# Patient Record
Sex: MALE | Race: WHITE | NOT HISPANIC OR LATINO | Employment: FULL TIME | URBAN - METROPOLITAN AREA
[De-identification: names, ages, dates, MRNs, and addresses within clinical notes are randomized per-mention and may not be internally consistent; named-entity substitution may affect disease eponyms.]

---

## 2020-03-27 ENCOUNTER — HOSPITAL ENCOUNTER (EMERGENCY)
Facility: HOSPITAL | Age: 40
Discharge: HOME | End: 2020-03-27
Admitting: EMERGENCY MEDICINE

## 2020-03-27 VITALS
BODY MASS INDEX: 21.02 KG/M2 | WEIGHT: 155.2 LBS | SYSTOLIC BLOOD PRESSURE: 110 MMHG | HEART RATE: 74 BPM | RESPIRATION RATE: 19 BRPM | DIASTOLIC BLOOD PRESSURE: 82 MMHG | OXYGEN SATURATION: 95 % | TEMPERATURE: 97.4 F | HEIGHT: 72 IN

## 2020-03-27 DIAGNOSIS — K04.7 DENTAL ABSCESS: Primary | ICD-10-CM

## 2020-03-27 PROCEDURE — 99282 EMERGENCY DEPT VISIT SF MDM: CPT

## 2020-03-27 RX ORDER — PENICILLIN V POTASSIUM 500 MG/1
500 TABLET ORAL 4 TIMES DAILY
Qty: 40 TABLET | Refills: 0 | Status: SHIPPED | OUTPATIENT
Start: 2020-03-27 | End: 2020-10-06

## 2020-03-27 RX ORDER — IBUPROFEN 800 MG/1
800 TABLET ORAL EVERY 6 HOURS PRN
Qty: 30 TABLET | Refills: 0 | Status: SHIPPED | OUTPATIENT
Start: 2020-03-27 | End: 2020-10-06

## 2020-10-06 ENCOUNTER — HOSPITAL ENCOUNTER (OUTPATIENT)
Facility: HOSPITAL | Age: 40
Setting detail: OBSERVATION
Discharge: HOME | End: 2020-10-07
Attending: EMERGENCY MEDICINE | Admitting: HOSPITALIST

## 2020-10-06 DIAGNOSIS — F10.939 ALCOHOL WITHDRAWAL SYNDROME WITH COMPLICATION (HCC): ICD-10-CM

## 2020-10-06 DIAGNOSIS — R55 SYNCOPE, UNSPECIFIED SYNCOPE TYPE: Primary | ICD-10-CM

## 2020-10-06 LAB
ALBUMIN SERPL-MCNC: 5.1 G/DL (ref 3.5–5.2)
ALBUMIN/GLOB SERPL: 1.4 G/DL
ALP SERPL-CCNC: 72 U/L (ref 39–117)
ALT SERPL W P-5'-P-CCNC: 110 U/L (ref 1–41)
ANION GAP SERPL CALCULATED.3IONS-SCNC: 19 MMOL/L (ref 5–15)
AST SERPL-CCNC: 277 U/L (ref 1–40)
BASOPHILS # BLD AUTO: 0.1 10*3/MM3 (ref 0–0.2)
BASOPHILS NFR BLD AUTO: 2 % (ref 0–1.5)
BILIRUB SERPL-MCNC: 0.5 MG/DL (ref 0–1.2)
BUN SERPL-MCNC: 7 MG/DL (ref 6–20)
BUN SERPL-MCNC: ABNORMAL MG/DL
BUN/CREAT SERPL: ABNORMAL
CALCIUM SPEC-SCNC: 9.2 MG/DL (ref 8.6–10.5)
CHLORIDE SERPL-SCNC: 102 MMOL/L (ref 98–107)
CK SERPL-CCNC: 116 U/L (ref 20–200)
CO2 SERPL-SCNC: 22 MMOL/L (ref 22–29)
CREAT SERPL-MCNC: 0.79 MG/DL (ref 0.76–1.27)
DEPRECATED RDW RBC AUTO: 52.5 FL (ref 37–54)
EOSINOPHIL # BLD AUTO: 0 10*3/MM3 (ref 0–0.4)
EOSINOPHIL NFR BLD AUTO: 0.2 % (ref 0.3–6.2)
ERYTHROCYTE [DISTWIDTH] IN BLOOD BY AUTOMATED COUNT: 15 % (ref 12.3–15.4)
ETHANOL UR QL: 0.42 %
GFR SERPL CREATININE-BSD FRML MDRD: 109 ML/MIN/1.73
GLOBULIN UR ELPH-MCNC: 3.7 GM/DL
GLUCOSE SERPL-MCNC: 113 MG/DL (ref 65–99)
HCT VFR BLD AUTO: 52.1 % (ref 37.5–51)
HGB BLD-MCNC: 17.9 G/DL (ref 13–17.7)
LIPASE SERPL-CCNC: 71 U/L (ref 13–60)
LYMPHOCYTES # BLD AUTO: 1.4 10*3/MM3 (ref 0.7–3.1)
LYMPHOCYTES NFR BLD AUTO: 34.1 % (ref 19.6–45.3)
MAGNESIUM SERPL-MCNC: 2.2 MG/DL (ref 1.6–2.6)
MCH RBC QN AUTO: 34.2 PG (ref 26.6–33)
MCHC RBC AUTO-ENTMCNC: 34.4 G/DL (ref 31.5–35.7)
MCV RBC AUTO: 99.3 FL (ref 79–97)
MONOCYTES # BLD AUTO: 0.2 10*3/MM3 (ref 0.1–0.9)
MONOCYTES NFR BLD AUTO: 5.9 % (ref 5–12)
NEUTROPHILS NFR BLD AUTO: 2.5 10*3/MM3 (ref 1.7–7)
NEUTROPHILS NFR BLD AUTO: 57.8 % (ref 42.7–76)
NRBC BLD AUTO-RTO: 0.2 /100 WBC (ref 0–0.2)
PLATELET # BLD AUTO: 206 10*3/MM3 (ref 140–450)
PMV BLD AUTO: 6.5 FL (ref 6–12)
POTASSIUM SERPL-SCNC: 4 MMOL/L (ref 3.5–5.2)
PROT SERPL-MCNC: 8.8 G/DL (ref 6–8.5)
RBC # BLD AUTO: 5.24 10*6/MM3 (ref 4.14–5.8)
SODIUM SERPL-SCNC: 143 MMOL/L (ref 136–145)
TROPONIN T SERPL-MCNC: <0.01 NG/ML (ref 0–0.03)
WBC # BLD AUTO: 4.3 10*3/MM3 (ref 3.4–10.8)

## 2020-10-06 PROCEDURE — 93005 ELECTROCARDIOGRAM TRACING: CPT | Performed by: EMERGENCY MEDICINE

## 2020-10-06 PROCEDURE — 99285 EMERGENCY DEPT VISIT HI MDM: CPT

## 2020-10-06 PROCEDURE — 99219 PR INITIAL OBSERVATION CARE/DAY 50 MINUTES: CPT | Performed by: STUDENT IN AN ORGANIZED HEALTH CARE EDUCATION/TRAINING PROGRAM

## 2020-10-06 PROCEDURE — 25010000002 MAGNESIUM SULFATE 2 GM/50ML SOLUTION: Performed by: EMERGENCY MEDICINE

## 2020-10-06 PROCEDURE — 96375 TX/PRO/DX INJ NEW DRUG ADDON: CPT

## 2020-10-06 PROCEDURE — 25010000002 THIAMINE PER 100 MG: Performed by: EMERGENCY MEDICINE

## 2020-10-06 PROCEDURE — 96365 THER/PROPH/DIAG IV INF INIT: CPT

## 2020-10-06 PROCEDURE — 80307 DRUG TEST PRSMV CHEM ANLYZR: CPT | Performed by: EMERGENCY MEDICINE

## 2020-10-06 PROCEDURE — 96368 THER/DIAG CONCURRENT INF: CPT

## 2020-10-06 PROCEDURE — 85025 COMPLETE CBC W/AUTO DIFF WBC: CPT | Performed by: EMERGENCY MEDICINE

## 2020-10-06 PROCEDURE — 83735 ASSAY OF MAGNESIUM: CPT | Performed by: EMERGENCY MEDICINE

## 2020-10-06 PROCEDURE — 80053 COMPREHEN METABOLIC PANEL: CPT | Performed by: EMERGENCY MEDICINE

## 2020-10-06 PROCEDURE — 83690 ASSAY OF LIPASE: CPT | Performed by: EMERGENCY MEDICINE

## 2020-10-06 PROCEDURE — 25010000002 LORAZEPAM PER 2 MG: Performed by: EMERGENCY MEDICINE

## 2020-10-06 PROCEDURE — 84484 ASSAY OF TROPONIN QUANT: CPT | Performed by: EMERGENCY MEDICINE

## 2020-10-06 PROCEDURE — G0378 HOSPITAL OBSERVATION PER HR: HCPCS

## 2020-10-06 PROCEDURE — 82550 ASSAY OF CK (CPK): CPT | Performed by: EMERGENCY MEDICINE

## 2020-10-06 RX ORDER — SODIUM CHLORIDE 0.9 % (FLUSH) 0.9 %
10 SYRINGE (ML) INJECTION AS NEEDED
Status: DISCONTINUED | OUTPATIENT
Start: 2020-10-06 | End: 2020-10-07 | Stop reason: HOSPADM

## 2020-10-06 RX ORDER — MAGNESIUM SULFATE HEPTAHYDRATE 40 MG/ML
2 INJECTION, SOLUTION INTRAVENOUS ONCE
Status: COMPLETED | OUTPATIENT
Start: 2020-10-06 | End: 2020-10-06

## 2020-10-06 RX ORDER — LORAZEPAM 2 MG/ML
1 INJECTION INTRAMUSCULAR ONCE
Status: COMPLETED | OUTPATIENT
Start: 2020-10-06 | End: 2020-10-06

## 2020-10-06 RX ADMIN — LORAZEPAM 1 MG: 2 INJECTION INTRAMUSCULAR; INTRAVENOUS at 18:32

## 2020-10-06 RX ADMIN — LORAZEPAM 1 MG: 2 INJECTION INTRAMUSCULAR; INTRAVENOUS at 23:29

## 2020-10-06 RX ADMIN — FOLIC ACID 1000 ML/HR: 5 INJECTION, SOLUTION INTRAMUSCULAR; INTRAVENOUS; SUBCUTANEOUS at 19:15

## 2020-10-06 RX ADMIN — MAGNESIUM SULFATE HEPTAHYDRATE 2 G: 40 INJECTION, SOLUTION INTRAVENOUS at 19:15

## 2020-10-06 NOTE — ED NOTES
Pt reports that he has been on a binder drinking a 1/5 of vodka a day because it was the anniversary of his brothers death. Reports he passed out on the toilet and that wife called EMS. Pt reports he does want help and has already looked  Into rehab.     Priscila Hood, LPN  10/06/20 7411

## 2020-10-06 NOTE — ED PROVIDER NOTES
Subjective   Complaint alcohol withdrawal passed out    History of present illness 40-year-old male who states he has history of alcoholism drinks heavily every day who has not had alcohol in last 12 hours complains of twitching nauseated withdrawal type symptoms.  He states he was trying go the bathroom and he passed out he states for 15 minutes there was no seizure activity described there was no loss of bladder bowel control and he did not bite his tongue.  He states he is going to a treatment facility in [place] he is leaving Thursday.  Will be there for 30 days.  He denies any other drug use.  Symptoms are moderate nothing makes it better or worse continuous all day today.          Review of Systems   Constitutional: Negative for chills and fever.   HENT: Negative for congestion and sinus pressure.    Eyes: Negative for photophobia and visual disturbance.   Respiratory: Negative for chest tightness and shortness of breath.    Cardiovascular: Negative for chest pain and leg swelling.   Gastrointestinal: Positive for nausea. Negative for abdominal pain.   Endocrine: Negative for cold intolerance and heat intolerance.   Genitourinary: Negative for difficulty urinating and dysuria.   Musculoskeletal: Negative for arthralgias and back pain.   Skin: Negative for color change and pallor.   Neurological: Positive for tremors and syncope. Negative for dizziness, seizures, facial asymmetry, speech difficulty and light-headedness.   Psychiatric/Behavioral: Positive for agitation. Negative for behavioral problems and suicidal ideas.       No past medical history on file.  Alcoholism  No Known Allergies    Past Surgical History:   Procedure Laterality Date   • FRACTURE SURGERY         No family history on file.    Social History     Socioeconomic History   • Marital status:      Spouse name: Not on file   • Number of children: Not on file   • Years of education: Not on file   • Highest education level: Not on file    Tobacco Use   • Smoking status: Current Every Day Smoker     Packs/day: 1.00   Substance and Sexual Activity   • Alcohol use: Yes   • Drug use: Not Currently   • Sexual activity: Yes     Partners: Female     Prior to Admission medications    Medication Sig Start Date End Date Taking? Authorizing Provider   ibuprofen (ADVIL,MOTRIN) 800 MG tablet Take 1 tablet by mouth Every 6 (Six) Hours As Needed for Moderate Pain . 3/27/20   Alexia Holman APRN   penicillin v potassium (VEETID) 500 MG tablet Take 1 tablet by mouth 4 (Four) Times a Day. 3/27/20   Alexia Holman APRN           Objective   Physical Exam  40-year-old male awake alert no acute distress but has some muscle twitching HEENT extraocular is tach pupils equal react there is no photophobia is no nystagmus mouth clear his tongue is normal neck supple no adenopathy no JVD no bruits no cervical rest lumbar spine tenderness noted lungs clear no retractions heart regular without murmur M was soft no tenderness or masses extremities pulses equal upper and lower extremities no edema cords or Homans sign no evidence of DVT no cellulitic changes.  He is awake alert orientated x4 no facial asymmetry normal speech there is no weakness in extremities no focal deficits  Procedures           ED Course      Results for orders placed or performed during the hospital encounter of 10/06/20   Comprehensive Metabolic Panel    Specimen: Blood   Result Value Ref Range    Glucose 113 (H) 65 - 99 mg/dL    BUN      Creatinine 0.79 0.76 - 1.27 mg/dL    Sodium 143 136 - 145 mmol/L    Potassium 4.0 3.5 - 5.2 mmol/L    Chloride 102 98 - 107 mmol/L    CO2 22.0 22.0 - 29.0 mmol/L    Calcium 9.2 8.6 - 10.5 mg/dL    Total Protein 8.8 (H) 6.0 - 8.5 g/dL    Albumin 5.10 3.50 - 5.20 g/dL    ALT (SGPT) 110 (H) 1 - 41 U/L    AST (SGOT) 277 (H) 1 - 40 U/L    Alkaline Phosphatase 72 39 - 117 U/L    Total Bilirubin 0.5 0.0 - 1.2 mg/dL    eGFR Non African Amer 109 >60 mL/min/1.73    Globulin 3.7  gm/dL    A/G Ratio 1.4 g/dL    BUN/Creatinine Ratio      Anion Gap 19.0 (H) 5.0 - 15.0 mmol/L   Lipase    Specimen: Blood   Result Value Ref Range    Lipase 71 (H) 13 - 60 U/L   Troponin    Specimen: Blood   Result Value Ref Range    Troponin T <0.010 0.000 - 0.030 ng/mL   Ethanol    Specimen: Blood   Result Value Ref Range    Ethanol % 0.420 %   CK    Specimen: Blood   Result Value Ref Range    Creatine Kinase 116 20 - 200 U/L   Magnesium    Specimen: Blood   Result Value Ref Range    Magnesium 2.2 1.6 - 2.6 mg/dL   CBC Auto Differential    Specimen: Blood   Result Value Ref Range    WBC 4.30 3.40 - 10.80 10*3/mm3    RBC 5.24 4.14 - 5.80 10*6/mm3    Hemoglobin 17.9 (H) 13.0 - 17.7 g/dL    Hematocrit 52.1 (H) 37.5 - 51.0 %    MCV 99.3 (H) 79.0 - 97.0 fL    MCH 34.2 (H) 26.6 - 33.0 pg    MCHC 34.4 31.5 - 35.7 g/dL    RDW 15.0 12.3 - 15.4 %    RDW-SD 52.5 37.0 - 54.0 fl    MPV 6.5 6.0 - 12.0 fL    Platelets 206 140 - 450 10*3/mm3    Neutrophil % 57.8 42.7 - 76.0 %    Lymphocyte % 34.1 19.6 - 45.3 %    Monocyte % 5.9 5.0 - 12.0 %    Eosinophil % 0.2 (L) 0.3 - 6.2 %    Basophil % 2.0 (H) 0.0 - 1.5 %    Neutrophils, Absolute 2.50 1.70 - 7.00 10*3/mm3    Lymphocytes, Absolute 1.40 0.70 - 3.10 10*3/mm3    Monocytes, Absolute 0.20 0.10 - 0.90 10*3/mm3    Eosinophils, Absolute 0.00 0.00 - 0.40 10*3/mm3    Basophils, Absolute 0.10 0.00 - 0.20 10*3/mm3    nRBC 0.2 0.0 - 0.2 /100 WBC   BUN    Specimen: Blood   Result Value Ref Range    BUN 7 6 - 20 mg/dL     No radiology results for the last day  Medications   sodium chloride 0.9 % flush 10 mL (has no administration in time range)   LORazepam (ATIVAN) injection 1 mg (has no administration in time range)   multiple vitamin (M.V.I. Adult) 10 mL, thiamine (B-1) 100 mg, folic acid 1 mg in sodium chloride 0.9 % 1,000 mL infusion (0 mL/hr Intravenous Stopped 10/6/20 2140)   LORazepam (ATIVAN) injection 1 mg (1 mg Intravenous Given 10/6/20 3182)   magnesium sulfate 2g/50 mL (PREMIX)  infusion (0 g Intravenous Stopped 10/6/20 2015)          EKG my interpretation normal sinus rhythm rate of 84 normal axis no hypertrophy QTC of 437 normal EKG read no significant change from previous                                  MDM  Number of Diagnoses or Management Options  Alcohol withdrawal syndrome with complication (CMS/[facility]):   Syncope, unspecified syncope type:   Diagnosis management comments: Medical decision making.  Patient IV status started on a banana bag and given Ativan 1 mg IV and had the above exam evaluation CBC unremarkable hemoglobin is 17.9 lesion was normal troponin negative alcohol was 0.420 chemistries revealed a blood sugar 113 ALT of 110 AST of 277 bilirubin was normal lipase was 71.  The patient had no dysrhythmia or further syncopal episodes he was still very tremulous and somewhat shaky with blood pressure a little bit improved.  Heart rate in the 90s.  Given additional milligram of Ativan IV.  We talked about the findings.  I think his syncopal episode was probably more related to vasovagal dehydration.  We talked about outpatient versus inpatient but very uncomfortable going home.  I do not see any evidence of acute cardiac ischemia acute DVT pulmonary medicine or aortic dissection by clinical exam.  He was made aware of findings hospitalist nurse practitioner paged he will be placed in observation for further care stable unremarkable improved ER course      Final diagnoses:   Syncope, unspecified syncope type   Alcohol withdrawal syndrome with complication (CMS/[facility])            Ryan Gan MD  10/06/20 7812

## 2020-10-07 ENCOUNTER — READMISSION MANAGEMENT (OUTPATIENT)
Dept: CALL CENTER | Facility: HOSPITAL | Age: 40
End: 2020-10-07

## 2020-10-07 VITALS
BODY MASS INDEX: 24.11 KG/M2 | HEART RATE: 115 BPM | SYSTOLIC BLOOD PRESSURE: 129 MMHG | DIASTOLIC BLOOD PRESSURE: 87 MMHG | HEIGHT: 72 IN | RESPIRATION RATE: 20 BRPM | OXYGEN SATURATION: 99 % | WEIGHT: 178 LBS | TEMPERATURE: 98.4 F

## 2020-10-07 PROBLEM — F10.939 ALCOHOL WITHDRAWAL (HCC): Status: ACTIVE | Noted: 2020-10-07

## 2020-10-07 PROBLEM — F10.939 ALCOHOL WITHDRAWAL (HCC): Status: RESOLVED | Noted: 2020-10-07 | Resolved: 2020-10-07

## 2020-10-07 LAB
ALBUMIN SERPL-MCNC: 4.7 G/DL (ref 3.5–5.2)
ALP SERPL-CCNC: 63 U/L (ref 39–117)
ALT SERPL W P-5'-P-CCNC: 89 U/L (ref 1–41)
AMMONIA BLD-SCNC: 27 UMOL/L (ref 16–60)
ANION GAP SERPL CALCULATED.3IONS-SCNC: 17 MMOL/L (ref 5–15)
AST SERPL-CCNC: 159 U/L (ref 1–40)
BASOPHILS # BLD AUTO: 0.2 10*3/MM3 (ref 0–0.2)
BASOPHILS NFR BLD AUTO: 2.7 % (ref 0–1.5)
BILIRUB CONJ SERPL-MCNC: 0.2 MG/DL (ref 0–0.3)
BILIRUB INDIRECT SERPL-MCNC: 0.6 MG/DL
BILIRUB SERPL-MCNC: 0.8 MG/DL (ref 0–1.2)
BUN SERPL-MCNC: 11 MG/DL (ref 6–20)
BUN SERPL-MCNC: ABNORMAL MG/DL
BUN/CREAT SERPL: ABNORMAL
CALCIUM SPEC-SCNC: 8.6 MG/DL (ref 8.6–10.5)
CHLORIDE SERPL-SCNC: 99 MMOL/L (ref 98–107)
CO2 SERPL-SCNC: 23 MMOL/L (ref 22–29)
CREAT SERPL-MCNC: 0.76 MG/DL (ref 0.76–1.27)
DEPRECATED RDW RBC AUTO: 51.2 FL (ref 37–54)
EOSINOPHIL # BLD AUTO: 0 10*3/MM3 (ref 0–0.4)
EOSINOPHIL NFR BLD AUTO: 0.2 % (ref 0.3–6.2)
ERYTHROCYTE [DISTWIDTH] IN BLOOD BY AUTOMATED COUNT: 14.9 % (ref 12.3–15.4)
GFR SERPL CREATININE-BSD FRML MDRD: 114 ML/MIN/1.73
GLUCOSE BLDC GLUCOMTR-MCNC: 159 MG/DL (ref 70–105)
GLUCOSE BLDC GLUCOMTR-MCNC: 97 MG/DL (ref 70–105)
GLUCOSE SERPL-MCNC: 74 MG/DL (ref 65–99)
HCT VFR BLD AUTO: 46.2 % (ref 37.5–51)
HGB BLD-MCNC: 15.7 G/DL (ref 13–17.7)
LYMPHOCYTES # BLD AUTO: 2.1 10*3/MM3 (ref 0.7–3.1)
LYMPHOCYTES NFR BLD AUTO: 35.8 % (ref 19.6–45.3)
MCH RBC QN AUTO: 33.7 PG (ref 26.6–33)
MCHC RBC AUTO-ENTMCNC: 34.1 G/DL (ref 31.5–35.7)
MCV RBC AUTO: 98.8 FL (ref 79–97)
MONOCYTES # BLD AUTO: 0.7 10*3/MM3 (ref 0.1–0.9)
MONOCYTES NFR BLD AUTO: 11.9 % (ref 5–12)
NEUTROPHILS NFR BLD AUTO: 2.9 10*3/MM3 (ref 1.7–7)
NEUTROPHILS NFR BLD AUTO: 49.4 % (ref 42.7–76)
NRBC BLD AUTO-RTO: 0.2 /100 WBC (ref 0–0.2)
PLATELET # BLD AUTO: 181 10*3/MM3 (ref 140–450)
PMV BLD AUTO: 6.8 FL (ref 6–12)
POTASSIUM SERPL-SCNC: 4.3 MMOL/L (ref 3.5–5.2)
PROT SERPL-MCNC: 8 G/DL (ref 6–8.5)
RBC # BLD AUTO: 4.67 10*6/MM3 (ref 4.14–5.8)
SODIUM SERPL-SCNC: 139 MMOL/L (ref 136–145)
WBC # BLD AUTO: 5.9 10*3/MM3 (ref 3.4–10.8)

## 2020-10-07 PROCEDURE — 80076 HEPATIC FUNCTION PANEL: CPT | Performed by: STUDENT IN AN ORGANIZED HEALTH CARE EDUCATION/TRAINING PROGRAM

## 2020-10-07 PROCEDURE — 96376 TX/PRO/DX INJ SAME DRUG ADON: CPT

## 2020-10-07 PROCEDURE — G0378 HOSPITAL OBSERVATION PER HR: HCPCS

## 2020-10-07 PROCEDURE — 25010000002 ONDANSETRON PER 1 MG: Performed by: STUDENT IN AN ORGANIZED HEALTH CARE EDUCATION/TRAINING PROGRAM

## 2020-10-07 PROCEDURE — 82140 ASSAY OF AMMONIA: CPT | Performed by: STUDENT IN AN ORGANIZED HEALTH CARE EDUCATION/TRAINING PROGRAM

## 2020-10-07 PROCEDURE — 99217 PR OBSERVATION CARE DISCHARGE MANAGEMENT: CPT | Performed by: HOSPITALIST

## 2020-10-07 PROCEDURE — 85025 COMPLETE CBC W/AUTO DIFF WBC: CPT | Performed by: STUDENT IN AN ORGANIZED HEALTH CARE EDUCATION/TRAINING PROGRAM

## 2020-10-07 PROCEDURE — 80048 BASIC METABOLIC PNL TOTAL CA: CPT | Performed by: STUDENT IN AN ORGANIZED HEALTH CARE EDUCATION/TRAINING PROGRAM

## 2020-10-07 PROCEDURE — 25010000002 LORAZEPAM PER 2 MG: Performed by: STUDENT IN AN ORGANIZED HEALTH CARE EDUCATION/TRAINING PROGRAM

## 2020-10-07 PROCEDURE — 82962 GLUCOSE BLOOD TEST: CPT

## 2020-10-07 PROCEDURE — 96375 TX/PRO/DX INJ NEW DRUG ADDON: CPT

## 2020-10-07 RX ORDER — CHOLECALCIFEROL (VITAMIN D3) 125 MCG
5 CAPSULE ORAL NIGHTLY PRN
Status: DISCONTINUED | OUTPATIENT
Start: 2020-10-07 | End: 2020-10-07 | Stop reason: HOSPADM

## 2020-10-07 RX ORDER — LORAZEPAM 0.5 MG/1
0.5 TABLET ORAL
Status: DISCONTINUED | OUTPATIENT
Start: 2020-10-07 | End: 2020-10-07 | Stop reason: HOSPADM

## 2020-10-07 RX ORDER — LORAZEPAM 1 MG/1
2 TABLET ORAL
Status: DISCONTINUED | OUTPATIENT
Start: 2020-10-07 | End: 2020-10-07 | Stop reason: HOSPADM

## 2020-10-07 RX ORDER — ONDANSETRON 2 MG/ML
4 INJECTION INTRAMUSCULAR; INTRAVENOUS EVERY 6 HOURS PRN
Status: DISCONTINUED | OUTPATIENT
Start: 2020-10-07 | End: 2020-10-07 | Stop reason: HOSPADM

## 2020-10-07 RX ORDER — NITROGLYCERIN 0.4 MG/1
0.4 TABLET SUBLINGUAL
Status: DISCONTINUED | OUTPATIENT
Start: 2020-10-07 | End: 2020-10-07 | Stop reason: HOSPADM

## 2020-10-07 RX ORDER — ONDANSETRON 4 MG/1
4 TABLET, FILM COATED ORAL EVERY 6 HOURS PRN
Status: DISCONTINUED | OUTPATIENT
Start: 2020-10-07 | End: 2020-10-07 | Stop reason: HOSPADM

## 2020-10-07 RX ORDER — POTASSIUM CHLORIDE 7.45 MG/ML
10 INJECTION INTRAVENOUS
Status: DISCONTINUED | OUTPATIENT
Start: 2020-10-07 | End: 2020-10-07 | Stop reason: HOSPADM

## 2020-10-07 RX ORDER — MAGNESIUM SULFATE 1 G/100ML
1 INJECTION INTRAVENOUS AS NEEDED
Status: DISCONTINUED | OUTPATIENT
Start: 2020-10-07 | End: 2020-10-07 | Stop reason: HOSPADM

## 2020-10-07 RX ORDER — BISACODYL 10 MG
10 SUPPOSITORY, RECTAL RECTAL DAILY PRN
Status: DISCONTINUED | OUTPATIENT
Start: 2020-10-07 | End: 2020-10-07 | Stop reason: HOSPADM

## 2020-10-07 RX ORDER — ACETAMINOPHEN 325 MG/1
650 TABLET ORAL EVERY 4 HOURS PRN
Status: DISCONTINUED | OUTPATIENT
Start: 2020-10-07 | End: 2020-10-07 | Stop reason: HOSPADM

## 2020-10-07 RX ORDER — ACETAMINOPHEN 650 MG/1
650 SUPPOSITORY RECTAL EVERY 4 HOURS PRN
Status: DISCONTINUED | OUTPATIENT
Start: 2020-10-07 | End: 2020-10-07 | Stop reason: HOSPADM

## 2020-10-07 RX ORDER — POTASSIUM CHLORIDE 1.5 G/1.77G
40 POWDER, FOR SOLUTION ORAL AS NEEDED
Status: DISCONTINUED | OUTPATIENT
Start: 2020-10-07 | End: 2020-10-07 | Stop reason: HOSPADM

## 2020-10-07 RX ORDER — LORAZEPAM 2 MG/ML
2 INJECTION INTRAMUSCULAR
Status: DISCONTINUED | OUTPATIENT
Start: 2020-10-07 | End: 2020-10-07 | Stop reason: HOSPADM

## 2020-10-07 RX ORDER — ACETAMINOPHEN 160 MG/5ML
650 SOLUTION ORAL EVERY 4 HOURS PRN
Status: DISCONTINUED | OUTPATIENT
Start: 2020-10-07 | End: 2020-10-07 | Stop reason: HOSPADM

## 2020-10-07 RX ORDER — DOCUSATE SODIUM 100 MG/1
100 CAPSULE, LIQUID FILLED ORAL 2 TIMES DAILY PRN
Status: DISCONTINUED | OUTPATIENT
Start: 2020-10-07 | End: 2020-10-07 | Stop reason: HOSPADM

## 2020-10-07 RX ORDER — LORAZEPAM 2 MG/ML
1 INJECTION INTRAMUSCULAR
Status: DISCONTINUED | OUTPATIENT
Start: 2020-10-07 | End: 2020-10-07 | Stop reason: HOSPADM

## 2020-10-07 RX ORDER — SODIUM CHLORIDE 0.9 % (FLUSH) 0.9 %
10 SYRINGE (ML) INJECTION AS NEEDED
Status: DISCONTINUED | OUTPATIENT
Start: 2020-10-07 | End: 2020-10-07 | Stop reason: HOSPADM

## 2020-10-07 RX ORDER — MAGNESIUM SULFATE HEPTAHYDRATE 40 MG/ML
2 INJECTION, SOLUTION INTRAVENOUS AS NEEDED
Status: DISCONTINUED | OUTPATIENT
Start: 2020-10-07 | End: 2020-10-07 | Stop reason: HOSPADM

## 2020-10-07 RX ORDER — NICOTINE 21 MG/24HR
1 PATCH, TRANSDERMAL 24 HOURS TRANSDERMAL
Status: DISCONTINUED | OUTPATIENT
Start: 2020-10-07 | End: 2020-10-07 | Stop reason: HOSPADM

## 2020-10-07 RX ORDER — POTASSIUM CHLORIDE 20 MEQ/1
40 TABLET, EXTENDED RELEASE ORAL AS NEEDED
Status: DISCONTINUED | OUTPATIENT
Start: 2020-10-07 | End: 2020-10-07 | Stop reason: HOSPADM

## 2020-10-07 RX ORDER — SODIUM CHLORIDE 0.9 % (FLUSH) 0.9 %
10 SYRINGE (ML) INJECTION EVERY 12 HOURS SCHEDULED
Status: DISCONTINUED | OUTPATIENT
Start: 2020-10-07 | End: 2020-10-07 | Stop reason: HOSPADM

## 2020-10-07 RX ADMIN — ONDANSETRON 4 MG: 2 INJECTION INTRAMUSCULAR; INTRAVENOUS at 08:45

## 2020-10-07 RX ADMIN — Medication 10 ML: at 08:46

## 2020-10-07 RX ADMIN — NICOTINE 1 PATCH: 14 PATCH TRANSDERMAL at 03:41

## 2020-10-07 RX ADMIN — LORAZEPAM 2 MG: 1 TABLET ORAL at 08:45

## 2020-10-07 RX ADMIN — LORAZEPAM 2 MG: 2 INJECTION INTRAMUSCULAR; INTRAVENOUS at 03:38

## 2020-10-07 RX ADMIN — LORAZEPAM 0.5 MG: 0.5 TABLET ORAL at 14:17

## 2020-10-07 RX ADMIN — Medication 10 ML: at 01:18

## 2020-10-07 NOTE — OUTREACH NOTE
Prep Survey      Responses   [facility] patient discharged from?  [facility]   Is LACE score < 7 ?  Yes   Eligibility  [facility]   Date of Admission  10/06/20   Date of Discharge  10/07/20   Discharge diagnosis  Alcohol withdrawal syndrome with complication    Does the patient have one of the following disease processes/diagnoses(primary or secondary)?  Other   Does the patient have Home health ordered?  No   Is there a DME ordered?  No   Prep survey completed?  Yes          Inocencia Carrera RN

## 2020-10-07 NOTE — PROGRESS NOTES
Continued Stay Note  JAYNA Huang     Patient Name: Aleksandr Smith  MRN: [id number]  Today's Date: 10/7/2020    Admit Date: 10/6/2020    Discharge Plan     Row Name 10/07/20 1510       Plan    Plan Comments  SW met with pt at bedside wearing appropriate PPE. Pt reported having [employer] insurance and does not need med assist services. Pt reported having a five year history of alcohol abuse. Pt reported drinking 2 pints of vodka daily. Pt [facility] [place] [facility] two years ago and relapsed last year. Pt reported not being interested in any inpatient or out patient treatment facility at this time.        Juliette ACKERMAN   Cell: [phone]  Office: [phone]  Fax: [phone]

## 2020-10-07 NOTE — NURSING NOTE
Pt has been asked several times since his arrival to provide a urine specimen. Pt was asked again at 1900 just before IV fluid admin and he stated he cannot urinate but maybe after fluids. Pt was again asked just now and maintains that he doesn't have to pee.

## 2020-10-07 NOTE — PROGRESS NOTES
Discharge Planning Assessment  [facility]     Patient Name: Aleksandr Smith  MRN: [id number]  Today's Date: 10/7/2020    Admit Date: 10/6/2020    Discharge Needs Assessment     Row Name 10/07/20 1619       Living Environment    Lives With  spouse;child(jovita), dependent    Unique Family Situation  children are 8,10 and 15 yo    Current Living Arrangements  home/apartment/condo    Primary Care Provided by  self    Provides Primary Care For  child(jovita)    Family Caregiver if Needed  spouse    Able to Return to Prior Arrangements  yes       Resource/Environmental Concerns    Resource/Environmental Concerns  none       Transition Planning    Patient/Family Anticipates Transition to  home with family    Patient/Family Anticipated Services at Transition  none    Transportation Anticipated  family or friend will provide       Discharge Needs Assessment    Readmission Within the Last 30 Days  no previous admission in last 30 days    Equipment Currently Used at Home  none    Concerns to be Addressed  denies needs/concerns at this time    Concerns Comments  Pt states he has [employer] insurance,    Anticipated Changes Related to Illness  none    Equipment Needed After Discharge  none    Discharge Coordination/Progress  DC Plan: home with family.        Discharge Plan     Row Name 10/07/20 2173       Plan    Plan Comments  SW met with pt at bedside wearing appropriate PPE. Pt reported having [employer] insurance and does not need med assist services. Pt reported having a five year history of alcohol abuse. Pt reported drinking 2 pints of vodka daily. Pt reported going to [facility] for treatment two years ago and relapsed last year. Pt reported not being interested in any inpatient or out patient treatment facility at this time.               Expected Discharge Date and Time     Expected Discharge Date Expected Discharge Time    Oct 7, 2020         Demographic Summary     Row Name 10/07/20 1618       General Information     Admission Type  observation    Referral Source  admission list    Reason for Consult  discharge planning    Preferred Language  English     Used During This Interaction  no    General Information Comments  Spoke with pt, 6 ft distance, mask, goggles in place.                     Zaid Shah RN

## 2020-10-07 NOTE — H&P
[facility]      Patient Name: Aleksandr Smith  : 1980  MRN: [id number]  Primary Care Physician: Gibson Benoit Jr., MD  Date of admission: 10/6/2020    Patient Care Team:  Gibson Benoit Jr., MD as PCP - General          Subjective   History Present Illness     Chief Complaint:   Chief Complaint   Patient presents with   • Withdrawal       Mr. Smith is a 40 y.o. male who presents to [facility] ED with no reported medical history complaining of.  Patient states he has not had a drink in approximately 8 hours.  He states he normally drinks pints of vodka a day patient states he was possibly going to [place] and starting rehab on 10/8/2020.  He had no intention of stopping drug in today however he drank extremely excessively yesterday and passed out.  While he was passed out his wife called EMS.       In the ED, , troponin negative, glucose 113, anion gap 19.0,  , lipase 71, magnesium globin 17.9, MCV 99.3.  Blood alcohol level 0.420. EKG shows sinus rhythm with a rate of 84.  Patient admitted for further evaluation and treatment.          Review of Systems   Constitution: Negative for fever.   Cardiovascular: Negative for chest pain.   Respiratory: Negative for shortness of breath.    Gastrointestinal: Positive for nausea. Negative for abdominal pain and vomiting.   Psychiatric/Behavioral:        Alcholism   All other systems reviewed and are negative.          Personal History     Past Medical History: History reviewed. No pertinent past medical history.    Surgical History:      Past Surgical History:   Procedure Laterality Date   • FRACTURE SURGERY         Family History: family history includes No Known Problems in his father and mother. Otherwise pertinent FHx was reviewed and unremarkable.     Social History:  reports that he has been smoking. He has been smoking about 1.00 pack per day. He does not have any smokeless  tobacco history on file. He reports current alcohol use. He reports previous drug use.      Medications:  Prior to Admission medications    Medication Sig Start Date End Date Taking? Authorizing Provider   ibuprofen (ADVIL,MOTRIN) 800 MG tablet Take 1 tablet by mouth Every 6 (Six) Hours As Needed for Moderate Pain . 3/27/20   Alexia Holman APRN   penicillin v potassium (VEETID) 500 MG tablet Take 1 tablet by mouth 4 (Four) Times a Day. 3/27/20   Alexia Holman APRN       Allergies:  No Known Allergies    Objective   Objective     Vital Signs  Temp:  [97.1 °F (36.2 °C)] 97.1 °F (36.2 °C)  Heart Rate:  [84-93] 93  Resp:  [18] 18  BP: (104-147)/() 131/91  SpO2:  [96 %-98 %] 98 %  on   ;   Device (Oxygen Therapy): room air  Body mass index is 24.14 kg/m².    Physical Exam  Vitals signs reviewed.   Constitutional:       Appearance: Normal appearance. He is normal weight.   HENT:      Head: Normocephalic and atraumatic.      Nose: Nose normal.      Mouth/Throat:      Mouth: Mucous membranes are moist.      Pharynx: Oropharynx is clear.   Eyes:      Conjunctiva/sclera: Conjunctivae normal.      Pupils: Pupils are equal, round, and reactive to light.   Neck:      Musculoskeletal: Normal range of motion.   Cardiovascular:      Rate and Rhythm: Normal rate and regular rhythm.      Pulses: Normal pulses.      Heart sounds: Normal heart sounds.   Pulmonary:      Effort: Pulmonary effort is normal.      Breath sounds: Normal breath sounds.   Abdominal:      General: Bowel sounds are normal.      Palpations: Abdomen is soft.   Musculoskeletal: Normal range of motion.   Skin:     General: Skin is warm and dry.      Capillary Refill: Capillary refill takes less than 2 seconds.   Neurological:      General: No focal deficit present.      Mental Status: He is alert and oriented to person, place, and time.   Psychiatric:         Mood and Affect: Mood normal.         Behavior: Behavior normal.         Thought Content: Thought  content normal.         Judgment: Judgment normal.         Results Review:  I have personally reviewed most recent cardiac tracings and lab results and agree with findings.    Results from last 7 days   Lab Units 10/06/20  1826   WBC 10*3/mm3 4.30   HEMOGLOBIN g/dL 17.9*   HEMATOCRIT % 52.1*   PLATELETS 10*3/mm3 206     Results from last 7 days   Lab Units 10/06/20  1826   SODIUM mmol/L 143   POTASSIUM mmol/L 4.0   CHLORIDE mmol/L 102   CO2 mmol/L 22.0   BUN  7   CREATININE mg/dL 0.79   GLUCOSE mg/dL 113*   CALCIUM mg/dL 9.2   ALT (SGPT) U/L 110*   AST (SGOT) U/L 277*   TROPONIN T ng/mL <0.010     Estimated Creatinine Clearance: 141.9 mL/min (by C-G formula based on SCr of 0.79 mg/dL).  Brief Urine Lab Results     None          Microbiology Results (last 10 days)     ** No results found for the last 240 hours. **          ECG/EMG Results (most recent)     Procedure Component Value Units Date/Time    ECG 12 Lead [[id number]] Collected: 10/06/20 1810     Updated: 10/06/20 1811    Narrative:      HEART RATE= 84  bpm  RR Interval= 716  ms  NE Interval= 161  ms  P Horizontal Axis= 0  deg  P Front Axis= 11  deg  QRSD Interval= 102  ms  QT Interval= 391  ms  QRS Axis= 69  deg  T Wave Axis= 17  deg  - NORMAL ECG -  Sinus rhythm  ST elev, probable normal early repol pattern  When compared with ECG of 15-Dec-2014 14:50:12,  No significant change  Electronically Signed By:   Date and Time of Study: 2020-10-06 18:10:16              No radiology results for the last 7 days      Estimated Creatinine Clearance: 141.9 mL/min (by C-G formula based on SCr of 0.79 mg/dL).    Assessment/Plan   Assessment/Plan       Active Hospital Problems    Diagnosis  POA   • **Alcohol withdrawal (CMS/HCC) [F10.239]  Yes     Priority: High   • Syncope [R55]  Yes      Resolved Hospital Problems   No resolved problems to display.       Alcholism  -Encourage lifestyle modifications  -Elevated liver enzymes, monitor   -CIWA protocol  -Banana bag ordered  daily x3 days diet  -Falls precautions  -Seizure precautions  -Ammonia level, urine myoglobin  - Continuous cardiac monitoring  - Case management consult        VTE Prophylaxis -   Mechanical Order History:     SCDs      Pharmalogical Order History:     None          CODE STATUS:    Code Status and Medical Interventions:   Ordered at: 10/07/20 0041     Code Status:    CPR     Medical Interventions (Level of Support Prior to Arrest):    Full       This patient has been examined wearing appropriate Personal Protective Equipment . 10/06/20      I discussed the patient's findings and my recommendations with patient.        Electronically signed by SAL Grande, 10/06/20, 9:54 PM EDT.  [facility] Hospitalist Team

## 2020-10-07 NOTE — DISCHARGE SUMMARY
"      [facility] Medicine Services  DISCHARGE SUMMARY        Prepared For PCP:  Gibson Benoit Jr., MD    Patient Name: Aleksandr Smith  : 1980  MRN: [id number]      Date of Admission:   10/6/2020    Date of Discharge:  10/7/2020    Length of stay:  LOS: 0 days     Hospital Course     Presenting Problem:   Alcohol withdrawal syndrome with complication (CMS/HCC) [F10.239]  Syncope, unspecified syncope type [R55]      Active Hospital Problems   No active problems to display.      Resolved Hospital Problems    Diagnosis Date Resolved POA   • **Alcohol withdrawal (CMS/HCC) [F10.239] 10/07/2020 Yes     Priority: High           Hospital Course:    The patient was placed on observation.  He was able to sleep overnight tolerating diet and having no bladder or bowel dysfunction.  He was discharged home and needs to follow-up with his PCP            Recommendation for Outpatient Providers:         Patient needs to be monitored for alcohol abuse.    Reasons For Change In Medications and Indications for New Medications:        Day of Discharge     HPI:     The patient is a 40 y.o. male that presented to the emergency room on 10/6/20 after drinking alcohol.  The patient claimed to have               \" passed out \" at home the day before.  In the ED, blood alcohol level was 0.43.    Vital Signs:   Temp:  [97.1 °F (36.2 °C)-98.3 °F (36.8 °C)] 98.3 °F (36.8 °C)  Heart Rate:  [79-93] 86  Resp:  [18-20] 20  BP: (100-147)/() 136/87     Physical Exam:  Physical Exam  HENT:      Head: Normocephalic.      Mouth/Throat:      Mouth: Mucous membranes are moist.   Eyes:      Extraocular Movements: Extraocular movements intact.      Pupils: Pupils are equal, round, and reactive to light.   Neck:      Musculoskeletal: Normal range of motion.   Cardiovascular:      Rate and Rhythm: Normal rate and regular rhythm.   Pulmonary:      Effort: Pulmonary effort is normal.      Breath sounds: Normal breath sounds. "   Abdominal:      General: Bowel sounds are normal.      Palpations: Abdomen is soft.   Musculoskeletal: Normal range of motion.   Skin:     General: Skin is warm and dry.   Neurological:      General: No focal deficit present.      Mental Status: He is alert and oriented to person, place, and time.   Psychiatric:         Mood and Affect: Mood normal.         Pertinent  and/or Most Recent Results     Results from last 7 days   Lab Units 10/07/20  0605 10/06/20  1826   WBC 10*3/mm3 5.90 4.30   HEMOGLOBIN g/dL 15.7 17.9*   HEMATOCRIT % 46.2 52.1*   PLATELETS 10*3/mm3 181 206   SODIUM mmol/L 139 143   POTASSIUM mmol/L 4.3 4.0   CHLORIDE mmol/L 99 102   CO2 mmol/L 23.0 22.0   BUN  11 7   CREATININE mg/dL 0.76 0.79   GLUCOSE mg/dL 74 113*   CALCIUM mg/dL 8.6 9.2     Results from last 7 days   Lab Units 10/07/20  0605 10/06/20  1826   BILIRUBIN mg/dL 0.8 0.5   ALK PHOS U/L 63 72   ALT (SGPT) U/L 89* 110*   AST (SGOT) U/L 159* 277*           Invalid input(s): TG, LDLCALC, LDLREALC  Results from last 7 days   Lab Units 10/06/20  1826   TROPONIN T ng/mL <0.010       Brief Urine Lab Results     None          Microbiology Results Abnormal     None                                      Test Results Pending at Discharge        Procedures Performed: None           Consults:   Consults     Date and Time Order Name Status Description    10/6/2020 4178 Hospitalist (on-call MD unless specified) Completed             Discharge Details        Discharge Medications      Patient Not Prescribed Medications Upon Discharge         No Known Allergies      Discharge Disposition:  Home or Self Care    Diet:  Hospital:  Diet Order   Procedures   • Diet Regular         Discharge Activity: as tolerated        CODE STATUS:    Code Status and Medical Interventions:   Ordered at: 10/07/20 0041     Code Status:    CPR     Medical Interventions (Level of Support Prior to Arrest):    Full         Follow-up Appointments  No future  appointments.    Additional Instructions for the Follow-ups that You Need to Schedule     Discharge Follow-up with PCP   As directed       Currently Documented PCP:    Gibson Benoit Jr., MD    PCP Phone Number:    [phone]     Follow Up Details: 2 weeks                 Condition on Discharge:      Stable      This patient has been examined wearing appropriate Personal Protective Equipment  10/07/20      Electronically signed by Jose Louis DO, 10/07/20, 11:56 AM EDT.      Time: I spent  15  minutes on this discharge activity which included face-to-face encounter with the patient/reviewing the data in the system/coordination of the care with the nursing staff as well as consultants/documentation/entering orders.

## 2020-10-08 ENCOUNTER — TRANSITIONAL CARE MANAGEMENT TELEPHONE ENCOUNTER (OUTPATIENT)
Dept: CALL CENTER | Facility: HOSPITAL | Age: 40
End: 2020-10-08

## 2020-10-08 NOTE — OUTREACH NOTE
Call Center TCM Note      Responses   [facility] patient discharged from?  [facility]   Does the patient have one of the following disease processes/diagnoses(primary or secondary)?  Other   TCM attempt successful?  No   Unsuccessful attempts  Attempt 1 [no verbal release]          Linn Romo RN    10/8/2020, 12:33 EDT

## 2020-10-08 NOTE — OUTREACH NOTE
Call Center TCM Note      Responses   [facility] patient discharged from?  [facility]   Does the patient have one of the following disease processes/diagnoses(primary or secondary)?  Other   TCM attempt successful?  No   Unsuccessful attempts  Attempt 2          Linn Romo RN    10/8/2020, 13:55 EDT

## 2020-10-09 ENCOUNTER — TRANSITIONAL CARE MANAGEMENT TELEPHONE ENCOUNTER (OUTPATIENT)
Dept: CALL CENTER | Facility: HOSPITAL | Age: 40
End: 2020-10-09

## 2020-10-09 NOTE — OUTREACH NOTE
Call Center TCM Note      Responses   [facility] patient discharged from?  [facility]   Does the patient have one of the following disease processes/diagnoses(primary or secondary)?  Other   TCM attempt successful?  No   Unsuccessful attempts  Attempt 3          Linn Romo RN    10/9/2020, 08:33 EDT

## 2020-12-11 ENCOUNTER — APPOINTMENT (OUTPATIENT)
Dept: GENERAL RADIOLOGY | Facility: HOSPITAL | Age: 40
End: 2020-12-11

## 2020-12-11 ENCOUNTER — HOSPITAL ENCOUNTER (EMERGENCY)
Facility: HOSPITAL | Age: 40
Discharge: HOME | End: 2020-12-11
Attending: EMERGENCY MEDICINE | Admitting: EMERGENCY MEDICINE

## 2020-12-11 VITALS
OXYGEN SATURATION: 98 % | HEIGHT: 72 IN | DIASTOLIC BLOOD PRESSURE: 70 MMHG | HEART RATE: 99 BPM | SYSTOLIC BLOOD PRESSURE: 155 MMHG | RESPIRATION RATE: 20 BRPM | TEMPERATURE: 99 F | BODY MASS INDEX: 21.98 KG/M2 | WEIGHT: 162.26 LBS

## 2020-12-11 DIAGNOSIS — R06.00 DYSPNEA, UNSPECIFIED TYPE: Primary | ICD-10-CM

## 2020-12-11 DIAGNOSIS — U07.1 COVID-19 VIRUS INFECTION: ICD-10-CM

## 2020-12-11 LAB
ARTERIAL PATENCY WRIST A: POSITIVE
ATMOSPHERIC PRESS: ABNORMAL MM[HG]
BASE EXCESS BLDA CALC-SCNC: 4.3 MMOL/L (ref 0–3)
BDY SITE: ABNORMAL
CO2 BLDA-SCNC: 28.4 MMOL/L (ref 22–29)
HCO3 BLDA-SCNC: 27.3 MMOL/L (ref 21–28)
HEMODILUTION: NO
INHALED O2 CONCENTRATION: 21 %
MODALITY: ABNORMAL
PCO2 BLDA: 35.2 MM HG (ref 35–48)
PH BLDA: 7.5 PH UNITS (ref 7.35–7.45)
PO2 BLDA: 72.8 MM HG (ref 83–108)
SAO2 % BLDCOA: 95.8 % (ref 94–98)

## 2020-12-11 PROCEDURE — 71045 X-RAY EXAM CHEST 1 VIEW: CPT

## 2020-12-11 PROCEDURE — 99283 EMERGENCY DEPT VISIT LOW MDM: CPT

## 2020-12-11 PROCEDURE — 36600 WITHDRAWAL OF ARTERIAL BLOOD: CPT

## 2020-12-11 PROCEDURE — 82803 BLOOD GASES ANY COMBINATION: CPT

## 2020-12-11 NOTE — ED NOTES
Pt reports having a positive COVID test 2 days ago.  Pt states his symptoms have become worse. Pt c/o joint pain, nausea, chills, headache, SOA, and oss of taste and smell.      Maria Elena Carrera RN  12/11/20 8292

## 2020-12-11 NOTE — ED PROVIDER NOTES
Subjective   Patient is a 4-year-old male who was diagnosed with Covid 2 days ago.  He is complaining of headache achiness cough and shortness of breath.  He states feels like he is worse today than he was 2 days ago.  He denies fever vomit diarrhea or other complaint          Review of Systems  Negative for headache earache sore throat fever chest pain vomiting diarrhea dysuria or other complaint  History reviewed. No pertinent past medical history.    No Known Allergies    Past Surgical History:   Procedure Laterality Date   • FRACTURE SURGERY         Family History   Problem Relation Age of Onset   • No Known Problems Mother    • No Known Problems Father        Social History     Socioeconomic History   • Marital status:      Spouse name: Not on file   • Number of children: Not on file   • Years of education: Not on file   • Highest education level: Not on file   Tobacco Use   • Smoking status: Current Every Day Smoker     Packs/day: 1.00   Substance and Sexual Activity   • Alcohol use: Yes     Frequency: 4 or more times a week     Drinks per session: 10 or more     Binge frequency: Daily or almost daily   • Drug use: Not Currently   • Sexual activity: Yes     Partners: Female           Objective   Physical Exam  HEENT exam shows TMs to be clear.  Oropharynx comers but sclerae nonicteric.  Neck has no adenopathy JVD or bruits.  Lungs are clear.  Heart has regular rhythm.  Chest is nontender.  Abdomen is soft nontender.  Extremity exam is unremarkable.  Procedures           ED Course      Results for orders placed or performed during the hospital encounter of 12/11/20   Blood Gas, Arterial -    Specimen: Arterial Blood   Result Value Ref Range    Site Right Radial     Jay's Test Positive     pH, Arterial 7.498 (H) 7.350 - 7.450 pH units    pCO2, Arterial 35.2 35.0 - 48.0 mm Hg    pO2, Arterial 72.8 (L) 83.0 - 108.0 mm Hg    HCO3, Arterial 27.3 21.0 - 28.0 mmol/L    Base Excess, Arterial 4.3 (H) 0.0 - 3.0  mmol/L    O2 Saturation, Arterial 95.8 94.0 - 98.0 %    CO2 Content 28.4 22 - 29 mmol/L    Barometric Pressure for Blood Gas      Modality Room Air     FIO2 21 %    Hemodilution No      Xr Chest 1 View    Result Date: 12/11/2020  1. No evidence of active disease.  Electronically Signed By-Bella Chatman MD On:12/11/2020 5:44 PM This report was finalized on [id number] by  Bella Chatman MD.                                         MDM  Number of Diagnoses or Management Options  Diagnosis management comments: Patient is positive for Covid.  He is not tachypneic or hypoxic.  There is no evidence of other infectious process or metabolic abnormality.  Patient will be discharged and will continue with Tylenol and ibuprofen.  To see his MD for recheck.       Amount and/or Complexity of Data Reviewed  Clinical lab tests: reviewed  Tests in the radiology section of CPT®: reviewed    Risk of Complications, Morbidity, and/or Mortality  Presenting problems: moderate  Diagnostic procedures: moderate  Management options: moderate    Patient Progress  Patient progress: stable      Final diagnoses:   Dyspnea, unspecified type   COVID-19 virus infection            Ameya Pimentel MD  12/11/20 3260

## 2021-01-23 ENCOUNTER — HOSPITAL ENCOUNTER (EMERGENCY)
Facility: HOSPITAL | Age: 41
Discharge: HOME | End: 2021-01-23
Attending: EMERGENCY MEDICINE | Admitting: EMERGENCY MEDICINE

## 2021-01-23 VITALS
RESPIRATION RATE: 16 BRPM | TEMPERATURE: 98.1 F | BODY MASS INDEX: 22.35 KG/M2 | SYSTOLIC BLOOD PRESSURE: 115 MMHG | HEIGHT: 72 IN | WEIGHT: 165 LBS | OXYGEN SATURATION: 100 % | DIASTOLIC BLOOD PRESSURE: 82 MMHG | HEART RATE: 70 BPM

## 2021-01-23 DIAGNOSIS — F10.920 ALCOHOLIC INTOXICATION WITHOUT COMPLICATION (HCC): Primary | ICD-10-CM

## 2021-01-23 LAB
ALBUMIN SERPL-MCNC: 4.2 G/DL (ref 3.5–5.2)
ALBUMIN/GLOB SERPL: 1.2 G/DL
ALP SERPL-CCNC: 107 U/L (ref 39–117)
ALT SERPL W P-5'-P-CCNC: 69 U/L (ref 1–41)
ANION GAP SERPL CALCULATED.3IONS-SCNC: 28 MMOL/L (ref 5–15)
APAP SERPL-MCNC: <5 MCG/ML (ref 0–30)
AST SERPL-CCNC: 174 U/L (ref 1–40)
BASOPHILS # BLD AUTO: 0.1 10*3/MM3 (ref 0–0.2)
BASOPHILS NFR BLD AUTO: 1.9 % (ref 0–1.5)
BILIRUB SERPL-MCNC: 1.9 MG/DL (ref 0–1.2)
BUN SERPL-MCNC: 17 MG/DL (ref 6–20)
BUN/CREAT SERPL: 23.9 (ref 7–25)
CALCIUM SPEC-SCNC: 8.6 MG/DL (ref 8.6–10.5)
CHLORIDE SERPL-SCNC: 80 MMOL/L (ref 98–107)
CO2 SERPL-SCNC: 22 MMOL/L (ref 22–29)
CREAT SERPL-MCNC: 0.71 MG/DL (ref 0.76–1.27)
DEPRECATED RDW RBC AUTO: 50.3 FL (ref 37–54)
EOSINOPHIL # BLD AUTO: 0 10*3/MM3 (ref 0–0.4)
EOSINOPHIL NFR BLD AUTO: 0 % (ref 0.3–6.2)
ERYTHROCYTE [DISTWIDTH] IN BLOOD BY AUTOMATED COUNT: 14.8 % (ref 12.3–15.4)
ETHANOL UR QL: 0.41 %
GFR SERPL CREATININE-BSD FRML MDRD: 123 ML/MIN/1.73
GLOBULIN UR ELPH-MCNC: 3.6 GM/DL
GLUCOSE SERPL-MCNC: 147 MG/DL (ref 65–99)
HCT VFR BLD AUTO: 45 % (ref 37.5–51)
HGB BLD-MCNC: 15.8 G/DL (ref 13–17.7)
HOLD SPECIMEN: NORMAL
LYMPHOCYTES # BLD AUTO: 1.5 10*3/MM3 (ref 0.7–3.1)
LYMPHOCYTES NFR BLD AUTO: 31.2 % (ref 19.6–45.3)
MCH RBC QN AUTO: 34.2 PG (ref 26.6–33)
MCHC RBC AUTO-ENTMCNC: 35.1 G/DL (ref 31.5–35.7)
MCV RBC AUTO: 97.3 FL (ref 79–97)
MONOCYTES # BLD AUTO: 0.6 10*3/MM3 (ref 0.1–0.9)
MONOCYTES NFR BLD AUTO: 12.2 % (ref 5–12)
NEUTROPHILS NFR BLD AUTO: 2.6 10*3/MM3 (ref 1.7–7)
NEUTROPHILS NFR BLD AUTO: 54.7 % (ref 42.7–76)
NRBC BLD AUTO-RTO: 0.1 /100 WBC (ref 0–0.2)
PLATELET # BLD AUTO: 113 10*3/MM3 (ref 140–450)
PMV BLD AUTO: 8.3 FL (ref 6–12)
POTASSIUM SERPL-SCNC: 3.1 MMOL/L (ref 3.5–5.2)
PROT SERPL-MCNC: 7.8 G/DL (ref 6–8.5)
RBC # BLD AUTO: 4.62 10*6/MM3 (ref 4.14–5.8)
SALICYLATES SERPL-MCNC: <0.3 MG/DL
SODIUM SERPL-SCNC: 130 MMOL/L (ref 136–145)
TROPONIN T SERPL-MCNC: <0.01 NG/ML (ref 0–0.03)
WBC # BLD AUTO: 4.7 10*3/MM3 (ref 3.4–10.8)
WHOLE BLOOD HOLD SPECIMEN: NORMAL

## 2021-01-23 PROCEDURE — 25010000002 ONDANSETRON PER 1 MG: Performed by: EMERGENCY MEDICINE

## 2021-01-23 PROCEDURE — 96365 THER/PROPH/DIAG IV INF INIT: CPT

## 2021-01-23 PROCEDURE — 25010000002 THIAMINE PER 100 MG: Performed by: EMERGENCY MEDICINE

## 2021-01-23 PROCEDURE — 96375 TX/PRO/DX INJ NEW DRUG ADDON: CPT

## 2021-01-23 PROCEDURE — 80053 COMPREHEN METABOLIC PANEL: CPT | Performed by: EMERGENCY MEDICINE

## 2021-01-23 PROCEDURE — 80179 DRUG ASSAY SALICYLATE: CPT | Performed by: EMERGENCY MEDICINE

## 2021-01-23 PROCEDURE — 36415 COLL VENOUS BLD VENIPUNCTURE: CPT

## 2021-01-23 PROCEDURE — 82077 ASSAY SPEC XCP UR&BREATH IA: CPT | Performed by: EMERGENCY MEDICINE

## 2021-01-23 PROCEDURE — 85025 COMPLETE CBC W/AUTO DIFF WBC: CPT | Performed by: EMERGENCY MEDICINE

## 2021-01-23 PROCEDURE — 99283 EMERGENCY DEPT VISIT LOW MDM: CPT

## 2021-01-23 PROCEDURE — 80143 DRUG ASSAY ACETAMINOPHEN: CPT | Performed by: EMERGENCY MEDICINE

## 2021-01-23 PROCEDURE — 84484 ASSAY OF TROPONIN QUANT: CPT | Performed by: EMERGENCY MEDICINE

## 2021-01-23 RX ORDER — SODIUM CHLORIDE 0.9 % (FLUSH) 0.9 %
10 SYRINGE (ML) INJECTION AS NEEDED
Status: DISCONTINUED | OUTPATIENT
Start: 2021-01-23 | End: 2021-01-23 | Stop reason: HOSPADM

## 2021-01-23 RX ORDER — ONDANSETRON 2 MG/ML
4 INJECTION INTRAMUSCULAR; INTRAVENOUS ONCE
Status: COMPLETED | OUTPATIENT
Start: 2021-01-23 | End: 2021-01-23

## 2021-01-23 RX ORDER — POTASSIUM CHLORIDE 20 MEQ/1
20 TABLET, EXTENDED RELEASE ORAL DAILY
Status: DISCONTINUED | OUTPATIENT
Start: 2021-01-23 | End: 2021-01-23 | Stop reason: HOSPADM

## 2021-01-23 RX ADMIN — ONDANSETRON 4 MG: 2 INJECTION, SOLUTION INTRAMUSCULAR; INTRAVENOUS at 08:57

## 2021-01-23 RX ADMIN — POTASSIUM CHLORIDE 20 MEQ: 1500 TABLET, EXTENDED RELEASE ORAL at 11:17

## 2021-01-23 RX ADMIN — THIAMINE HYDROCHLORIDE 1000 ML/HR: 100 INJECTION, SOLUTION INTRAMUSCULAR; INTRAVENOUS at 08:58

## 2021-01-23 NOTE — ED NOTES
I called [facility] and left a voicemail requesting they call me for etoh detox on this patient.  His current BA is .305.     Silvia Gonzalez, RegSched Rep  01/23/21 1221    
Notified Fawn COLIN that this patient's CBC needs to be recollected for underfill and analyzer error d/t volume.     Silvia Gonzalez, RegSched Rep  01/23/21 0937    
PT decided to go to [place] for rehab, wife waiting out front for pt transportation        Fawn Rico, RN  01/23/21 1576    
PT sent in by girlfriend for unresponsiveness after drinking his usual fifth of vodka. Pt has hx of alcoholism. Pt arousal to light stimulation        Fawn Rico RN  01/23/21 1021    
no

## 2021-02-07 ENCOUNTER — HOSPITAL ENCOUNTER (EMERGENCY)
Facility: HOSPITAL | Age: 41
Discharge: HOME | End: 2021-02-07
Attending: EMERGENCY MEDICINE | Admitting: EMERGENCY MEDICINE

## 2021-02-07 ENCOUNTER — APPOINTMENT (OUTPATIENT)
Dept: CT IMAGING | Facility: HOSPITAL | Age: 41
End: 2021-02-07

## 2021-02-07 VITALS
SYSTOLIC BLOOD PRESSURE: 128 MMHG | DIASTOLIC BLOOD PRESSURE: 67 MMHG | RESPIRATION RATE: 16 BRPM | OXYGEN SATURATION: 99 % | BODY MASS INDEX: 27.31 KG/M2 | HEART RATE: 101 BPM | TEMPERATURE: 98.9 F | HEIGHT: 66 IN | WEIGHT: 169.9 LBS

## 2021-02-07 DIAGNOSIS — F10.120: Primary | ICD-10-CM

## 2021-02-07 DIAGNOSIS — F11.10 OPIOID ABUSE (HCC): ICD-10-CM

## 2021-02-07 DIAGNOSIS — F19.90 SUBSTANCE USE DISORDER: ICD-10-CM

## 2021-02-07 DIAGNOSIS — F10.10 CHRONIC ALCOHOL ABUSE: ICD-10-CM

## 2021-02-07 LAB
ALBUMIN SERPL-MCNC: 4.1 G/DL (ref 3.5–5.2)
ALBUMIN/GLOB SERPL: 1.1 G/DL
ALP SERPL-CCNC: 104 U/L (ref 39–117)
ALT SERPL W P-5'-P-CCNC: 50 U/L (ref 1–41)
AMPHET+METHAMPHET UR QL: NEGATIVE
ANION GAP SERPL CALCULATED.3IONS-SCNC: 10 MMOL/L (ref 5–15)
AST SERPL-CCNC: 123 U/L (ref 1–40)
BARBITURATES UR QL SCN: NEGATIVE
BASOPHILS # BLD AUTO: 0.1 10*3/MM3 (ref 0–0.2)
BASOPHILS NFR BLD AUTO: 1.4 % (ref 0–1.5)
BENZODIAZ UR QL SCN: NEGATIVE
BILIRUB SERPL-MCNC: 0.3 MG/DL (ref 0–1.2)
BILIRUB UR QL STRIP: NEGATIVE
BUN SERPL-MCNC: 3 MG/DL (ref 6–20)
BUN/CREAT SERPL: 4.9 (ref 7–25)
CALCIUM SPEC-SCNC: 8.7 MG/DL (ref 8.6–10.5)
CANNABINOIDS SERPL QL: NEGATIVE
CHLORIDE SERPL-SCNC: 102 MMOL/L (ref 98–107)
CK SERPL-CCNC: 116 U/L (ref 20–200)
CLARITY UR: CLEAR
CO2 SERPL-SCNC: 30 MMOL/L (ref 22–29)
COCAINE UR QL: NEGATIVE
COLOR UR: YELLOW
CREAT SERPL-MCNC: 0.61 MG/DL (ref 0.76–1.27)
DEPRECATED RDW RBC AUTO: 61.3 FL (ref 37–54)
EOSINOPHIL # BLD AUTO: 0 10*3/MM3 (ref 0–0.4)
EOSINOPHIL NFR BLD AUTO: 0.7 % (ref 0.3–6.2)
ERYTHROCYTE [DISTWIDTH] IN BLOOD BY AUTOMATED COUNT: 17.2 % (ref 12.3–15.4)
ETHANOL UR QL: 0.48 %
GFR SERPL CREATININE-BSD FRML MDRD: 146 ML/MIN/1.73
GLOBULIN UR ELPH-MCNC: 3.6 GM/DL
GLUCOSE SERPL-MCNC: 104 MG/DL (ref 65–99)
GLUCOSE UR STRIP-MCNC: NEGATIVE MG/DL
HCT VFR BLD AUTO: 37.7 % (ref 37.5–51)
HGB BLD-MCNC: 12.9 G/DL (ref 13–17.7)
HGB UR QL STRIP.AUTO: NEGATIVE
HOLD SPECIMEN: NORMAL
KETONES UR QL STRIP: NEGATIVE
LEUKOCYTE ESTERASE UR QL STRIP.AUTO: NEGATIVE
LIPASE SERPL-CCNC: 91 U/L (ref 13–60)
LYMPHOCYTES # BLD AUTO: 3 10*3/MM3 (ref 0.7–3.1)
LYMPHOCYTES NFR BLD AUTO: 45.6 % (ref 19.6–45.3)
MCH RBC QN AUTO: 35.3 PG (ref 26.6–33)
MCHC RBC AUTO-ENTMCNC: 34.3 G/DL (ref 31.5–35.7)
MCV RBC AUTO: 103 FL (ref 79–97)
METHADONE UR QL SCN: NEGATIVE
MONOCYTES # BLD AUTO: 0.8 10*3/MM3 (ref 0.1–0.9)
MONOCYTES NFR BLD AUTO: 12 % (ref 5–12)
NEUTROPHILS NFR BLD AUTO: 2.6 10*3/MM3 (ref 1.7–7)
NEUTROPHILS NFR BLD AUTO: 40.3 % (ref 42.7–76)
NITRITE UR QL STRIP: NEGATIVE
NRBC BLD AUTO-RTO: 0.2 /100 WBC (ref 0–0.2)
OPIATES UR QL: POSITIVE
OXYCODONE UR QL SCN: NEGATIVE
PH UR STRIP.AUTO: 7 [PH] (ref 5–8)
PLATELET # BLD AUTO: 179 10*3/MM3 (ref 140–450)
PMV BLD AUTO: 6.3 FL (ref 6–12)
POTASSIUM SERPL-SCNC: 4.3 MMOL/L (ref 3.5–5.2)
PROT SERPL-MCNC: 7.7 G/DL (ref 6–8.5)
PROT UR QL STRIP: NEGATIVE
RBC # BLD AUTO: 3.66 10*6/MM3 (ref 4.14–5.8)
SODIUM SERPL-SCNC: 142 MMOL/L (ref 136–145)
SP GR UR STRIP: <=1.005 (ref 1–1.03)
TROPONIN T SERPL-MCNC: <0.01 NG/ML (ref 0–0.03)
UROBILINOGEN UR QL STRIP: NORMAL
WBC # BLD AUTO: 6.6 10*3/MM3 (ref 3.4–10.8)

## 2021-02-07 PROCEDURE — 70450 CT HEAD/BRAIN W/O DYE: CPT

## 2021-02-07 PROCEDURE — 80307 DRUG TEST PRSMV CHEM ANLYZR: CPT | Performed by: EMERGENCY MEDICINE

## 2021-02-07 PROCEDURE — 84484 ASSAY OF TROPONIN QUANT: CPT | Performed by: EMERGENCY MEDICINE

## 2021-02-07 PROCEDURE — 85025 COMPLETE CBC W/AUTO DIFF WBC: CPT | Performed by: EMERGENCY MEDICINE

## 2021-02-07 PROCEDURE — 83690 ASSAY OF LIPASE: CPT | Performed by: EMERGENCY MEDICINE

## 2021-02-07 PROCEDURE — 82550 ASSAY OF CK (CPK): CPT | Performed by: EMERGENCY MEDICINE

## 2021-02-07 PROCEDURE — 80053 COMPREHEN METABOLIC PANEL: CPT | Performed by: EMERGENCY MEDICINE

## 2021-02-07 PROCEDURE — 25010000002 THIAMINE PER 100 MG: Performed by: EMERGENCY MEDICINE

## 2021-02-07 PROCEDURE — 96365 THER/PROPH/DIAG IV INF INIT: CPT

## 2021-02-07 PROCEDURE — 99284 EMERGENCY DEPT VISIT MOD MDM: CPT

## 2021-02-07 PROCEDURE — 81003 URINALYSIS AUTO W/O SCOPE: CPT | Performed by: EMERGENCY MEDICINE

## 2021-02-07 PROCEDURE — 96375 TX/PRO/DX INJ NEW DRUG ADDON: CPT

## 2021-02-07 PROCEDURE — 25010000002 ONDANSETRON PER 1 MG: Performed by: EMERGENCY MEDICINE

## 2021-02-07 PROCEDURE — 82077 ASSAY SPEC XCP UR&BREATH IA: CPT | Performed by: EMERGENCY MEDICINE

## 2021-02-07 RX ORDER — ONDANSETRON 2 MG/ML
4 INJECTION INTRAMUSCULAR; INTRAVENOUS ONCE
Status: COMPLETED | OUTPATIENT
Start: 2021-02-07 | End: 2021-02-07

## 2021-02-07 RX ORDER — NALOXONE HYDROCHLORIDE 1 MG/ML
2 INJECTION INTRAMUSCULAR; INTRAVENOUS; SUBCUTANEOUS ONCE
Status: COMPLETED | OUTPATIENT
Start: 2021-02-07 | End: 2021-02-07

## 2021-02-07 RX ADMIN — ONDANSETRON 4 MG: 2 INJECTION, SOLUTION INTRAMUSCULAR; INTRAVENOUS at 04:35

## 2021-02-07 RX ADMIN — SODIUM CHLORIDE, POTASSIUM CHLORIDE, SODIUM LACTATE AND CALCIUM CHLORIDE 2000 ML: 600; 310; 30; 20 INJECTION, SOLUTION INTRAVENOUS at 05:18

## 2021-02-07 RX ADMIN — NALOXONE HYDROCHLORIDE 2 MG: 1 INJECTION PARENTERAL at 06:57

## 2021-02-07 RX ADMIN — THIAMINE HYDROCHLORIDE 1000 ML/HR: 100 INJECTION, SOLUTION INTRAMUSCULAR; INTRAVENOUS at 04:41
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
## 2021-02-07 NOTE — DISCHARGE INSTRUCTIONS
Rest next 24 hours  plenty of fluids  Tylenol as needed for discomfort  seek help for your substance abuse/use disorder

## 2021-02-07 NOTE — ED NOTES
Called Pt's wife to come get pt at this time. She agreed to do so and stated approc 15 min Juan Morton RN  02/07/21 1017

## 2021-02-07 NOTE — ED NOTES
Pt unresponsive to painful stimuli. Snoring respirations with increased oral secretions. NP airway placed. NT suctioned for large amounts clear secretions. SaO2 100 after suctioning.      Juan Carbajal RN  02/07/21 0748

## 2021-02-15 ENCOUNTER — HOSPITAL ENCOUNTER (INPATIENT)
Facility: HOSPITAL | Age: 41
LOS: 3 days | Discharge: HOME | End: 2021-02-18
Attending: EMERGENCY MEDICINE | Admitting: HOSPITALIST

## 2021-02-15 ENCOUNTER — APPOINTMENT (OUTPATIENT)
Dept: CT IMAGING | Facility: HOSPITAL | Age: 41
End: 2021-02-15

## 2021-02-15 DIAGNOSIS — F10.931 DELIRIUM TREMENS (HCC): Primary | ICD-10-CM

## 2021-02-15 DIAGNOSIS — R56.9 SEIZURE (HCC): ICD-10-CM

## 2021-02-15 LAB
ALBUMIN SERPL-MCNC: 4.9 G/DL (ref 3.5–5.2)
ALBUMIN/GLOB SERPL: 1.3 G/DL
ALP SERPL-CCNC: 96 U/L (ref 39–117)
ALT SERPL W P-5'-P-CCNC: 55 U/L (ref 1–41)
ANION GAP SERPL CALCULATED.3IONS-SCNC: 12 MMOL/L (ref 5–15)
AST SERPL-CCNC: 151 U/L (ref 1–40)
BASOPHILS # BLD AUTO: 0.1 10*3/MM3 (ref 0–0.2)
BASOPHILS NFR BLD AUTO: 1 % (ref 0–1.5)
BILIRUB SERPL-MCNC: 1.5 MG/DL (ref 0–1.2)
BUN SERPL-MCNC: 9 MG/DL (ref 6–20)
BUN/CREAT SERPL: 11.8 (ref 7–25)
CALCIUM SPEC-SCNC: 9.8 MG/DL (ref 8.6–10.5)
CHLORIDE SERPL-SCNC: 98 MMOL/L (ref 98–107)
CO2 SERPL-SCNC: 23 MMOL/L (ref 22–29)
CREAT SERPL-MCNC: 0.76 MG/DL (ref 0.76–1.27)
DEPRECATED RDW RBC AUTO: 60.8 FL (ref 37–54)
EOSINOPHIL # BLD AUTO: 0 10*3/MM3 (ref 0–0.4)
EOSINOPHIL NFR BLD AUTO: 0.3 % (ref 0.3–6.2)
ERYTHROCYTE [DISTWIDTH] IN BLOOD BY AUTOMATED COUNT: 16.9 % (ref 12.3–15.4)
ETHANOL UR QL: <0.01 %
GFR SERPL CREATININE-BSD FRML MDRD: 114 ML/MIN/1.73
GLOBULIN UR ELPH-MCNC: 3.7 GM/DL
GLUCOSE SERPL-MCNC: 81 MG/DL (ref 65–99)
HCT VFR BLD AUTO: 35.4 % (ref 37.5–51)
HGB BLD-MCNC: 12.6 G/DL (ref 13–17.7)
HOLD SPECIMEN: NORMAL
LYMPHOCYTES # BLD AUTO: 1.3 10*3/MM3 (ref 0.7–3.1)
LYMPHOCYTES NFR BLD AUTO: 18.1 % (ref 19.6–45.3)
MCH RBC QN AUTO: 36.1 PG (ref 26.6–33)
MCHC RBC AUTO-ENTMCNC: 35.4 G/DL (ref 31.5–35.7)
MCV RBC AUTO: 102 FL (ref 79–97)
MONOCYTES # BLD AUTO: 0.7 10*3/MM3 (ref 0.1–0.9)
MONOCYTES NFR BLD AUTO: 9.4 % (ref 5–12)
NEUTROPHILS NFR BLD AUTO: 5 10*3/MM3 (ref 1.7–7)
NEUTROPHILS NFR BLD AUTO: 71.2 % (ref 42.7–76)
NRBC BLD AUTO-RTO: 0.1 /100 WBC (ref 0–0.2)
PLATELET # BLD AUTO: 139 10*3/MM3 (ref 140–450)
PMV BLD AUTO: 7.7 FL (ref 6–12)
POTASSIUM SERPL-SCNC: 3.6 MMOL/L (ref 3.5–5.2)
PROT SERPL-MCNC: 8.6 G/DL (ref 6–8.5)
RBC # BLD AUTO: 3.48 10*6/MM3 (ref 4.14–5.8)
SODIUM SERPL-SCNC: 133 MMOL/L (ref 136–145)
WBC # BLD AUTO: 7 10*3/MM3 (ref 3.4–10.8)
WHOLE BLOOD HOLD SPECIMEN: NORMAL

## 2021-02-15 PROCEDURE — 25010000002 THIAMINE PER 100 MG: Performed by: EMERGENCY MEDICINE

## 2021-02-15 PROCEDURE — 99223 1ST HOSP IP/OBS HIGH 75: CPT | Performed by: NURSE PRACTITIONER

## 2021-02-15 PROCEDURE — 80053 COMPREHEN METABOLIC PANEL: CPT | Performed by: EMERGENCY MEDICINE

## 2021-02-15 PROCEDURE — G0378 HOSPITAL OBSERVATION PER HR: HCPCS

## 2021-02-15 PROCEDURE — 99284 EMERGENCY DEPT VISIT MOD MDM: CPT

## 2021-02-15 PROCEDURE — 25010000002 LORAZEPAM PER 2 MG: Performed by: NURSE PRACTITIONER

## 2021-02-15 PROCEDURE — 93005 ELECTROCARDIOGRAM TRACING: CPT | Performed by: NURSE PRACTITIONER

## 2021-02-15 PROCEDURE — 70450 CT HEAD/BRAIN W/O DYE: CPT

## 2021-02-15 PROCEDURE — 82077 ASSAY SPEC XCP UR&BREATH IA: CPT | Performed by: EMERGENCY MEDICINE

## 2021-02-15 PROCEDURE — 25010000002 ONDANSETRON PER 1 MG: Performed by: EMERGENCY MEDICINE

## 2021-02-15 PROCEDURE — 85025 COMPLETE CBC W/AUTO DIFF WBC: CPT | Performed by: EMERGENCY MEDICINE

## 2021-02-15 PROCEDURE — 25010000002 LORAZEPAM PER 2 MG: Performed by: EMERGENCY MEDICINE

## 2021-02-15 RX ORDER — DEXTROSE AND SODIUM CHLORIDE 5; .9 G/100ML; G/100ML
125 INJECTION, SOLUTION INTRAVENOUS CONTINUOUS
Status: DISCONTINUED | OUTPATIENT
Start: 2021-02-15 | End: 2021-02-18 | Stop reason: HOSPADM

## 2021-02-15 RX ORDER — ACETAMINOPHEN 325 MG/1
650 TABLET ORAL EVERY 4 HOURS PRN
Status: DISCONTINUED | OUTPATIENT
Start: 2021-02-15 | End: 2021-02-18 | Stop reason: HOSPADM

## 2021-02-15 RX ORDER — DEXTROSE, SODIUM CHLORIDE, AND POTASSIUM CHLORIDE 5; .45; .15 G/100ML; G/100ML; G/100ML
100 INJECTION INTRAVENOUS CONTINUOUS
Status: DISCONTINUED | OUTPATIENT
Start: 2021-02-15 | End: 2021-02-15

## 2021-02-15 RX ORDER — SODIUM CHLORIDE 0.9 % (FLUSH) 0.9 %
10 SYRINGE (ML) INJECTION AS NEEDED
Status: DISCONTINUED | OUTPATIENT
Start: 2021-02-15 | End: 2021-02-18 | Stop reason: HOSPADM

## 2021-02-15 RX ORDER — SODIUM CHLORIDE 0.9 % (FLUSH) 0.9 %
10 SYRINGE (ML) INJECTION EVERY 12 HOURS SCHEDULED
Status: DISCONTINUED | OUTPATIENT
Start: 2021-02-15 | End: 2021-02-18 | Stop reason: HOSPADM

## 2021-02-15 RX ORDER — LORAZEPAM 2 MG/ML
1 INJECTION INTRAMUSCULAR EVERY 8 HOURS
Status: COMPLETED | OUTPATIENT
Start: 2021-02-17 | End: 2021-02-17

## 2021-02-15 RX ORDER — LORAZEPAM 2 MG/ML
2 INJECTION INTRAMUSCULAR ONCE
Status: COMPLETED | OUTPATIENT
Start: 2021-02-15 | End: 2021-02-15

## 2021-02-15 RX ORDER — ONDANSETRON 4 MG/1
4 TABLET, FILM COATED ORAL EVERY 6 HOURS PRN
Status: DISCONTINUED | OUTPATIENT
Start: 2021-02-15 | End: 2021-02-18 | Stop reason: HOSPADM

## 2021-02-15 RX ORDER — LORAZEPAM 2 MG/ML
1 INJECTION INTRAMUSCULAR EVERY 6 HOURS
Status: DISPENSED | OUTPATIENT
Start: 2021-02-15 | End: 2021-02-16

## 2021-02-15 RX ORDER — LORAZEPAM 1 MG/1
1 TABLET ORAL
Status: DISCONTINUED | OUTPATIENT
Start: 2021-02-15 | End: 2021-02-18 | Stop reason: HOSPADM

## 2021-02-15 RX ORDER — LORAZEPAM 2 MG/ML
1 INJECTION INTRAMUSCULAR ONCE
Status: COMPLETED | OUTPATIENT
Start: 2021-02-15 | End: 2021-02-15

## 2021-02-15 RX ORDER — LORAZEPAM 2 MG/ML
2 INJECTION INTRAMUSCULAR
Status: DISCONTINUED | OUTPATIENT
Start: 2021-02-15 | End: 2021-02-18 | Stop reason: HOSPADM

## 2021-02-15 RX ORDER — CLONIDINE HYDROCHLORIDE 0.1 MG/1
0.1 TABLET ORAL EVERY 8 HOURS PRN
Status: DISCONTINUED | OUTPATIENT
Start: 2021-02-15 | End: 2021-02-18 | Stop reason: HOSPADM

## 2021-02-15 RX ORDER — LORAZEPAM 1 MG/1
2 TABLET ORAL
Status: DISCONTINUED | OUTPATIENT
Start: 2021-02-15 | End: 2021-02-18 | Stop reason: HOSPADM

## 2021-02-15 RX ORDER — FOLIC ACID 1 MG/1
1 TABLET ORAL DAILY
Status: DISCONTINUED | OUTPATIENT
Start: 2021-02-16 | End: 2021-02-18 | Stop reason: HOSPADM

## 2021-02-15 RX ORDER — NICOTINE 21 MG/24HR
1 PATCH, TRANSDERMAL 24 HOURS TRANSDERMAL NIGHTLY
Status: DISCONTINUED | OUTPATIENT
Start: 2021-02-15 | End: 2021-02-18 | Stop reason: HOSPADM

## 2021-02-15 RX ORDER — ONDANSETRON 2 MG/ML
4 INJECTION INTRAMUSCULAR; INTRAVENOUS EVERY 6 HOURS PRN
Status: DISCONTINUED | OUTPATIENT
Start: 2021-02-15 | End: 2021-02-18 | Stop reason: HOSPADM

## 2021-02-15 RX ORDER — LORAZEPAM 2 MG/ML
1 INJECTION INTRAMUSCULAR
Status: DISCONTINUED | OUTPATIENT
Start: 2021-02-15 | End: 2021-02-18 | Stop reason: HOSPADM

## 2021-02-15 RX ORDER — ACETAMINOPHEN 160 MG/5ML
650 SOLUTION ORAL EVERY 4 HOURS PRN
Status: DISCONTINUED | OUTPATIENT
Start: 2021-02-15 | End: 2021-02-18 | Stop reason: HOSPADM

## 2021-02-15 RX ORDER — ACETAMINOPHEN 650 MG/1
650 SUPPOSITORY RECTAL EVERY 4 HOURS PRN
Status: DISCONTINUED | OUTPATIENT
Start: 2021-02-15 | End: 2021-02-18 | Stop reason: HOSPADM

## 2021-02-15 RX ORDER — ONDANSETRON 2 MG/ML
4 INJECTION INTRAMUSCULAR; INTRAVENOUS ONCE
Status: COMPLETED | OUTPATIENT
Start: 2021-02-15 | End: 2021-02-15

## 2021-02-15 RX ADMIN — NICOTINE 1 PATCH: 21 PATCH TRANSDERMAL at 23:03

## 2021-02-15 RX ADMIN — LORAZEPAM 2 MG: 2 INJECTION INTRAMUSCULAR; INTRAVENOUS at 20:03

## 2021-02-15 RX ADMIN — Medication 10 ML: at 22:22

## 2021-02-15 RX ADMIN — DEXTROSE AND SODIUM CHLORIDE 125 ML/HR: 5; 900 INJECTION, SOLUTION INTRAVENOUS at 22:21

## 2021-02-15 RX ADMIN — THIAMINE HYDROCHLORIDE 1000 ML/HR: 100 INJECTION, SOLUTION INTRAMUSCULAR; INTRAVENOUS at 18:59

## 2021-02-15 RX ADMIN — Medication 100 MG: at 22:21

## 2021-02-15 RX ADMIN — LORAZEPAM 1 MG: 2 INJECTION INTRAMUSCULAR; INTRAVENOUS at 20:18

## 2021-02-15 RX ADMIN — LORAZEPAM 2 MG: 2 INJECTION INTRAMUSCULAR; INTRAVENOUS at 18:23

## 2021-02-15 RX ADMIN — LORAZEPAM 1 MG: 2 INJECTION INTRAMUSCULAR; INTRAVENOUS at 22:21

## 2021-02-15 RX ADMIN — LORAZEPAM 2 MG: 2 INJECTION INTRAMUSCULAR; INTRAVENOUS at 20:09

## 2021-02-15 RX ADMIN — ONDANSETRON 4 MG: 2 INJECTION, SOLUTION INTRAMUSCULAR; INTRAVENOUS at 20:02

## 2021-02-16 LAB
ALBUMIN SERPL-MCNC: 4.2 G/DL (ref 3.5–5.2)
ALBUMIN/GLOB SERPL: 1.5 G/DL
ALP SERPL-CCNC: 77 U/L (ref 39–117)
ALT SERPL W P-5'-P-CCNC: 46 U/L (ref 1–41)
AMPHET+METHAMPHET UR QL: POSITIVE
ANION GAP SERPL CALCULATED.3IONS-SCNC: 13 MMOL/L (ref 5–15)
AST SERPL-CCNC: 82 U/L (ref 1–40)
BARBITURATES UR QL SCN: NEGATIVE
BASOPHILS # BLD AUTO: 0.1 10*3/MM3 (ref 0–0.2)
BASOPHILS NFR BLD AUTO: 0.9 % (ref 0–1.5)
BENZODIAZ UR QL SCN: NEGATIVE
BILIRUB SERPL-MCNC: 1.5 MG/DL (ref 0–1.2)
BUN SERPL-MCNC: 5 MG/DL (ref 6–20)
BUN/CREAT SERPL: 7.4 (ref 7–25)
CALCIUM SPEC-SCNC: 9.2 MG/DL (ref 8.6–10.5)
CANNABINOIDS SERPL QL: NEGATIVE
CHLORIDE SERPL-SCNC: 102 MMOL/L (ref 98–107)
CO2 SERPL-SCNC: 21 MMOL/L (ref 22–29)
COCAINE UR QL: NEGATIVE
CREAT SERPL-MCNC: 0.68 MG/DL (ref 0.76–1.27)
DEPRECATED RDW RBC AUTO: 58.2 FL (ref 37–54)
EOSINOPHIL # BLD AUTO: 0 10*3/MM3 (ref 0–0.4)
EOSINOPHIL NFR BLD AUTO: 0.2 % (ref 0.3–6.2)
ERYTHROCYTE [DISTWIDTH] IN BLOOD BY AUTOMATED COUNT: 16.5 % (ref 12.3–15.4)
GFR SERPL CREATININE-BSD FRML MDRD: 129 ML/MIN/1.73
GLOBULIN UR ELPH-MCNC: 2.8 GM/DL
GLUCOSE SERPL-MCNC: 94 MG/DL (ref 65–99)
HCT VFR BLD AUTO: 33.6 % (ref 37.5–51)
HGB BLD-MCNC: 11.7 G/DL (ref 13–17.7)
LYMPHOCYTES # BLD AUTO: 1.1 10*3/MM3 (ref 0.7–3.1)
LYMPHOCYTES NFR BLD AUTO: 16.5 % (ref 19.6–45.3)
MCH RBC QN AUTO: 35.7 PG (ref 26.6–33)
MCHC RBC AUTO-ENTMCNC: 34.9 G/DL (ref 31.5–35.7)
MCV RBC AUTO: 102.4 FL (ref 79–97)
METHADONE UR QL SCN: NEGATIVE
MONOCYTES # BLD AUTO: 0.8 10*3/MM3 (ref 0.1–0.9)
MONOCYTES NFR BLD AUTO: 11.3 % (ref 5–12)
NEUTROPHILS NFR BLD AUTO: 4.9 10*3/MM3 (ref 1.7–7)
NEUTROPHILS NFR BLD AUTO: 71.1 % (ref 42.7–76)
NRBC BLD AUTO-RTO: 0 /100 WBC (ref 0–0.2)
OPIATES UR QL: NEGATIVE
OXYCODONE UR QL SCN: NEGATIVE
PLATELET # BLD AUTO: 94 10*3/MM3 (ref 140–450)
PMV BLD AUTO: 8 FL (ref 6–12)
POTASSIUM SERPL-SCNC: 3.3 MMOL/L (ref 3.5–5.2)
POTASSIUM SERPL-SCNC: 3.4 MMOL/L (ref 3.5–5.2)
PROT SERPL-MCNC: 7 G/DL (ref 6–8.5)
QT INTERVAL: 371 MS
RBC # BLD AUTO: 3.28 10*6/MM3 (ref 4.14–5.8)
SARS-COV-2 ORF1AB RESP QL NAA+PROBE: NOT DETECTED
SODIUM SERPL-SCNC: 136 MMOL/L (ref 136–145)
TROPONIN T SERPL-MCNC: <0.01 NG/ML (ref 0–0.03)
TSH SERPL DL<=0.05 MIU/L-ACNC: 0.72 UIU/ML (ref 0.27–4.2)
WBC # BLD AUTO: 6.9 10*3/MM3 (ref 3.4–10.8)

## 2021-02-16 PROCEDURE — 80307 DRUG TEST PRSMV CHEM ANLYZR: CPT | Performed by: NURSE PRACTITIONER

## 2021-02-16 PROCEDURE — 25010000002 LORAZEPAM PER 2 MG: Performed by: NURSE PRACTITIONER

## 2021-02-16 PROCEDURE — 84443 ASSAY THYROID STIM HORMONE: CPT | Performed by: NURSE PRACTITIONER

## 2021-02-16 PROCEDURE — 25010000002 ENOXAPARIN PER 10 MG: Performed by: NURSE PRACTITIONER

## 2021-02-16 PROCEDURE — 99232 SBSQ HOSP IP/OBS MODERATE 35: CPT | Performed by: HOSPITALIST

## 2021-02-16 PROCEDURE — 85025 COMPLETE CBC W/AUTO DIFF WBC: CPT | Performed by: NURSE PRACTITIONER

## 2021-02-16 PROCEDURE — U0004 COV-19 TEST NON-CDC HGH THRU: HCPCS | Performed by: HOSPITALIST

## 2021-02-16 PROCEDURE — 84132 ASSAY OF SERUM POTASSIUM: CPT | Performed by: HOSPITALIST

## 2021-02-16 PROCEDURE — 93010 ELECTROCARDIOGRAM REPORT: CPT | Performed by: INTERNAL MEDICINE

## 2021-02-16 PROCEDURE — G0378 HOSPITAL OBSERVATION PER HR: HCPCS

## 2021-02-16 PROCEDURE — 84484 ASSAY OF TROPONIN QUANT: CPT | Performed by: NURSE PRACTITIONER

## 2021-02-16 PROCEDURE — 80053 COMPREHEN METABOLIC PANEL: CPT | Performed by: NURSE PRACTITIONER

## 2021-02-16 RX ORDER — POTASSIUM CHLORIDE 7.45 MG/ML
10 INJECTION INTRAVENOUS
Status: DISCONTINUED | OUTPATIENT
Start: 2021-02-16 | End: 2021-02-18 | Stop reason: HOSPADM

## 2021-02-16 RX ORDER — POTASSIUM CHLORIDE 20 MEQ/1
40 TABLET, EXTENDED RELEASE ORAL AS NEEDED
Status: DISCONTINUED | OUTPATIENT
Start: 2021-02-16 | End: 2021-02-18 | Stop reason: HOSPADM

## 2021-02-16 RX ORDER — POTASSIUM CHLORIDE 1.5 G/1.77G
40 POWDER, FOR SOLUTION ORAL AS NEEDED
Status: DISCONTINUED | OUTPATIENT
Start: 2021-02-16 | End: 2021-02-18 | Stop reason: HOSPADM

## 2021-02-16 RX ADMIN — ENOXAPARIN SODIUM 40 MG: 40 INJECTION SUBCUTANEOUS at 15:32

## 2021-02-16 RX ADMIN — FOLIC ACID 1 MG: 1 TABLET ORAL at 09:04

## 2021-02-16 RX ADMIN — POTASSIUM CHLORIDE 40 MEQ: 1500 TABLET, EXTENDED RELEASE ORAL at 09:04

## 2021-02-16 RX ADMIN — POTASSIUM CHLORIDE 40 MEQ: 1500 TABLET, EXTENDED RELEASE ORAL at 14:31

## 2021-02-16 RX ADMIN — LORAZEPAM 2 MG: 2 INJECTION INTRAMUSCULAR; INTRAVENOUS at 22:47

## 2021-02-16 RX ADMIN — Medication 10 ML: at 09:07

## 2021-02-16 RX ADMIN — LORAZEPAM 2 MG: 2 INJECTION INTRAMUSCULAR; INTRAVENOUS at 00:39

## 2021-02-16 RX ADMIN — DEXTROSE AND SODIUM CHLORIDE 125 ML/HR: 5; 900 INJECTION, SOLUTION INTRAVENOUS at 07:10

## 2021-02-16 RX ADMIN — LORAZEPAM 1 MG: 2 INJECTION INTRAMUSCULAR; INTRAVENOUS at 06:02

## 2021-02-16 RX ADMIN — Medication 10 ML: at 22:47

## 2021-02-16 RX ADMIN — LORAZEPAM 1 MG: 2 INJECTION INTRAMUSCULAR; INTRAVENOUS at 09:04

## 2021-02-16 RX ADMIN — ACETAMINOPHEN 650 MG: 325 TABLET ORAL at 02:12

## 2021-02-16 RX ADMIN — LORAZEPAM 1 MG: 2 INJECTION INTRAMUSCULAR; INTRAVENOUS at 04:19

## 2021-02-16 RX ADMIN — LORAZEPAM 1 MG: 2 INJECTION INTRAMUSCULAR; INTRAVENOUS at 15:32

## 2021-02-16 RX ADMIN — DEXTROSE AND SODIUM CHLORIDE 125 ML/HR: 5; 900 INJECTION, SOLUTION INTRAVENOUS at 15:42

## 2021-02-17 ENCOUNTER — APPOINTMENT (OUTPATIENT)
Dept: GENERAL RADIOLOGY | Facility: HOSPITAL | Age: 41
End: 2021-02-17

## 2021-02-17 PROBLEM — J96.01 ACUTE HYPOXEMIC RESPIRATORY FAILURE (HCC): Status: ACTIVE | Noted: 2021-02-17

## 2021-02-17 LAB
ALBUMIN SERPL-MCNC: 3.8 G/DL (ref 3.5–5.2)
ALBUMIN/GLOB SERPL: 1.2 G/DL
ALP SERPL-CCNC: 67 U/L (ref 39–117)
ALT SERPL W P-5'-P-CCNC: 42 U/L (ref 1–41)
AMPHET+METHAMPHET UR QL: POSITIVE
ANION GAP SERPL CALCULATED.3IONS-SCNC: 10 MMOL/L (ref 5–15)
ARTERIAL PATENCY WRIST A: POSITIVE
AST SERPL-CCNC: 92 U/L (ref 1–40)
ATMOSPHERIC PRESS: ABNORMAL MM[HG]
B PARAPERT DNA SPEC QL NAA+PROBE: NOT DETECTED
B PERT DNA SPEC QL NAA+PROBE: NOT DETECTED
BARBITURATES UR QL SCN: NEGATIVE
BASE EXCESS BLDA CALC-SCNC: -2.4 MMOL/L (ref 0–3)
BASOPHILS # BLD AUTO: 0 10*3/MM3 (ref 0–0.2)
BASOPHILS NFR BLD AUTO: 1.3 % (ref 0–1.5)
BDY SITE: ABNORMAL
BENZODIAZ UR QL SCN: NEGATIVE
BILIRUB SERPL-MCNC: 1.1 MG/DL (ref 0–1.2)
BILIRUB UR QL STRIP: NEGATIVE
BUN SERPL-MCNC: 2 MG/DL (ref 6–20)
BUN/CREAT SERPL: 3.3 (ref 7–25)
C PNEUM DNA NPH QL NAA+NON-PROBE: NOT DETECTED
CALCIUM SPEC-SCNC: 8.6 MG/DL (ref 8.6–10.5)
CANNABINOIDS SERPL QL: NEGATIVE
CHLORIDE SERPL-SCNC: 107 MMOL/L (ref 98–107)
CLARITY UR: CLEAR
CO2 BLDA-SCNC: 22.8 MMOL/L (ref 22–29)
CO2 SERPL-SCNC: 20 MMOL/L (ref 22–29)
COCAINE UR QL: NEGATIVE
COLOR UR: YELLOW
CREAT SERPL-MCNC: 0.6 MG/DL (ref 0.76–1.27)
D DIMER PPP FEU-MCNC: 0.43 MG/L (FEU) (ref 0–0.59)
D-LACTATE SERPL-SCNC: 1.3 MMOL/L (ref 0.5–2)
DEPRECATED RDW RBC AUTO: 59.1 FL (ref 37–54)
EOSINOPHIL # BLD AUTO: 0 10*3/MM3 (ref 0–0.4)
EOSINOPHIL NFR BLD AUTO: 0.7 % (ref 0.3–6.2)
ERYTHROCYTE [DISTWIDTH] IN BLOOD BY AUTOMATED COUNT: 16.4 % (ref 12.3–15.4)
FLUAV SUBTYP SPEC NAA+PROBE: NOT DETECTED
FLUBV RNA ISLT QL NAA+PROBE: NOT DETECTED
GFR SERPL CREATININE-BSD FRML MDRD: 149 ML/MIN/1.73
GLOBULIN UR ELPH-MCNC: 3.1 GM/DL
GLUCOSE SERPL-MCNC: 111 MG/DL (ref 65–99)
GLUCOSE UR STRIP-MCNC: ABNORMAL MG/DL
HADV DNA SPEC NAA+PROBE: NOT DETECTED
HCO3 BLDA-SCNC: 21.7 MMOL/L (ref 21–28)
HCOV 229E RNA SPEC QL NAA+PROBE: NOT DETECTED
HCOV HKU1 RNA SPEC QL NAA+PROBE: NOT DETECTED
HCOV NL63 RNA SPEC QL NAA+PROBE: NOT DETECTED
HCOV OC43 RNA SPEC QL NAA+PROBE: NOT DETECTED
HCT VFR BLD AUTO: 32 % (ref 37.5–51)
HEMODILUTION: NO
HGB BLD-MCNC: 11.2 G/DL (ref 13–17.7)
HGB UR QL STRIP.AUTO: NEGATIVE
HMPV RNA NPH QL NAA+NON-PROBE: NOT DETECTED
HPIV1 RNA SPEC QL NAA+PROBE: NOT DETECTED
HPIV2 RNA SPEC QL NAA+PROBE: NOT DETECTED
HPIV3 RNA NPH QL NAA+PROBE: NOT DETECTED
HPIV4 P GENE NPH QL NAA+PROBE: NOT DETECTED
INHALED O2 CONCENTRATION: 21 %
KETONES UR QL STRIP: NEGATIVE
LDH SERPL-CCNC: 316 U/L (ref 135–225)
LEUKOCYTE ESTERASE UR QL STRIP.AUTO: NEGATIVE
LYMPHOCYTES # BLD AUTO: 1.1 10*3/MM3 (ref 0.7–3.1)
LYMPHOCYTES NFR BLD AUTO: 31.3 % (ref 19.6–45.3)
M PNEUMO IGG SER IA-ACNC: NOT DETECTED
MCH RBC QN AUTO: 35.9 PG (ref 26.6–33)
MCHC RBC AUTO-ENTMCNC: 35 G/DL (ref 31.5–35.7)
MCV RBC AUTO: 102.5 FL (ref 79–97)
METHADONE UR QL SCN: NEGATIVE
MODALITY: ABNORMAL
MONOCYTES # BLD AUTO: 0.6 10*3/MM3 (ref 0.1–0.9)
MONOCYTES NFR BLD AUTO: 17.2 % (ref 5–12)
MRSA DNA SPEC QL NAA+PROBE: NORMAL
NEUTROPHILS NFR BLD AUTO: 1.8 10*3/MM3 (ref 1.7–7)
NEUTROPHILS NFR BLD AUTO: 49.5 % (ref 42.7–76)
NITRITE UR QL STRIP: NEGATIVE
NRBC BLD AUTO-RTO: 0 /100 WBC (ref 0–0.2)
OPIATES UR QL: NEGATIVE
OXYCODONE UR QL SCN: NEGATIVE
PCO2 BLDA: 34.4 MM HG (ref 35–48)
PH BLDA: 7.41 PH UNITS (ref 7.35–7.45)
PH UR STRIP.AUTO: 6.5 [PH] (ref 5–8)
PLATELET # BLD AUTO: 112 10*3/MM3 (ref 140–450)
PMV BLD AUTO: 8 FL (ref 6–12)
PO2 BLDA: 42.2 MM HG (ref 83–108)
POTASSIUM SERPL-SCNC: 3 MMOL/L (ref 3.5–5.2)
POTASSIUM SERPL-SCNC: 3.6 MMOL/L (ref 3.5–5.2)
PROCALCITONIN SERPL-MCNC: 0.15 NG/ML (ref 0–0.25)
PROT SERPL-MCNC: 6.9 G/DL (ref 6–8.5)
PROT UR QL STRIP: NEGATIVE
RBC # BLD AUTO: 3.13 10*6/MM3 (ref 4.14–5.8)
RHINOVIRUS RNA SPEC NAA+PROBE: NOT DETECTED
RSV RNA NPH QL NAA+NON-PROBE: NOT DETECTED
SAO2 % BLDCOA: 78.5 % (ref 94–98)
SARS-COV-2 RNA NPH QL NAA+NON-PROBE: NOT DETECTED
SODIUM SERPL-SCNC: 137 MMOL/L (ref 136–145)
SP GR UR STRIP: 1.01 (ref 1–1.03)
T4 FREE SERPL-MCNC: 1.05 NG/DL (ref 0.93–1.7)
TROPONIN T SERPL-MCNC: <0.01 NG/ML (ref 0–0.03)
UROBILINOGEN UR QL STRIP: ABNORMAL
VIT B12 BLD-MCNC: 724 PG/ML (ref 211–946)
WBC # BLD AUTO: 3.7 10*3/MM3 (ref 3.4–10.8)

## 2021-02-17 PROCEDURE — 80307 DRUG TEST PRSMV CHEM ANLYZR: CPT | Performed by: HOSPITALIST

## 2021-02-17 PROCEDURE — 85379 FIBRIN DEGRADATION QUANT: CPT | Performed by: HOSPITALIST

## 2021-02-17 PROCEDURE — 71045 X-RAY EXAM CHEST 1 VIEW: CPT

## 2021-02-17 PROCEDURE — 87641 MR-STAPH DNA AMP PROBE: CPT | Performed by: HOSPITALIST

## 2021-02-17 PROCEDURE — 80053 COMPREHEN METABOLIC PANEL: CPT | Performed by: HOSPITALIST

## 2021-02-17 PROCEDURE — 25010000002 LORAZEPAM PER 2 MG: Performed by: NURSE PRACTITIONER

## 2021-02-17 PROCEDURE — 83605 ASSAY OF LACTIC ACID: CPT | Performed by: HOSPITALIST

## 2021-02-17 PROCEDURE — 99232 SBSQ HOSP IP/OBS MODERATE 35: CPT | Performed by: HOSPITALIST

## 2021-02-17 PROCEDURE — 84484 ASSAY OF TROPONIN QUANT: CPT | Performed by: PHYSICIAN ASSISTANT

## 2021-02-17 PROCEDURE — 25010000002 PIPERACILLIN SOD-TAZOBACTAM PER 1 G: Performed by: HOSPITALIST

## 2021-02-17 PROCEDURE — 83615 LACTATE (LD) (LDH) ENZYME: CPT | Performed by: HOSPITALIST

## 2021-02-17 PROCEDURE — 82607 VITAMIN B-12: CPT | Performed by: HOSPITALIST

## 2021-02-17 PROCEDURE — 85025 COMPLETE CBC W/AUTO DIFF WBC: CPT | Performed by: HOSPITALIST

## 2021-02-17 PROCEDURE — 97162 PT EVAL MOD COMPLEX 30 MIN: CPT

## 2021-02-17 PROCEDURE — 0202U NFCT DS 22 TRGT SARS-COV-2: CPT | Performed by: HOSPITALIST

## 2021-02-17 PROCEDURE — 87040 BLOOD CULTURE FOR BACTERIA: CPT | Performed by: HOSPITALIST

## 2021-02-17 PROCEDURE — 84439 ASSAY OF FREE THYROXINE: CPT | Performed by: HOSPITALIST

## 2021-02-17 PROCEDURE — 84132 ASSAY OF SERUM POTASSIUM: CPT | Performed by: HOSPITALIST

## 2021-02-17 PROCEDURE — 82803 BLOOD GASES ANY COMBINATION: CPT

## 2021-02-17 PROCEDURE — 81003 URINALYSIS AUTO W/O SCOPE: CPT | Performed by: HOSPITALIST

## 2021-02-17 PROCEDURE — 84145 PROCALCITONIN (PCT): CPT | Performed by: HOSPITALIST

## 2021-02-17 PROCEDURE — 97116 GAIT TRAINING THERAPY: CPT

## 2021-02-17 PROCEDURE — 97166 OT EVAL MOD COMPLEX 45 MIN: CPT

## 2021-02-17 PROCEDURE — 36600 WITHDRAWAL OF ARTERIAL BLOOD: CPT

## 2021-02-17 PROCEDURE — 25010000002 ENOXAPARIN PER 10 MG: Performed by: NURSE PRACTITIONER

## 2021-02-17 RX ADMIN — POTASSIUM CHLORIDE 40 MEQ: 1500 TABLET, EXTENDED RELEASE ORAL at 16:39

## 2021-02-17 RX ADMIN — PIPERACILLIN AND TAZOBACTAM 3.38 G: 3; .375 INJECTION, POWDER, LYOPHILIZED, FOR SOLUTION INTRAVENOUS at 22:47

## 2021-02-17 RX ADMIN — Medication 100 MG: at 08:05

## 2021-02-17 RX ADMIN — SODIUM CHLORIDE 1000 ML: 9 INJECTION, SOLUTION INTRAVENOUS at 17:18

## 2021-02-17 RX ADMIN — LORAZEPAM 1 MG: 2 INJECTION INTRAMUSCULAR; INTRAVENOUS at 01:07

## 2021-02-17 RX ADMIN — PIPERACILLIN AND TAZOBACTAM 3.38 G: 3; .375 INJECTION, POWDER, LYOPHILIZED, FOR SOLUTION INTRAVENOUS at 11:24

## 2021-02-17 RX ADMIN — Medication 10 ML: at 11:24

## 2021-02-17 RX ADMIN — POTASSIUM CHLORIDE 40 MEQ: 1500 TABLET, EXTENDED RELEASE ORAL at 08:02

## 2021-02-17 RX ADMIN — Medication 10 ML: at 21:43

## 2021-02-17 RX ADMIN — LORAZEPAM 2 MG: 2 INJECTION INTRAMUSCULAR; INTRAVENOUS at 02:35

## 2021-02-17 RX ADMIN — LORAZEPAM 1 MG: 2 INJECTION INTRAMUSCULAR; INTRAVENOUS at 08:06

## 2021-02-17 RX ADMIN — POTASSIUM CHLORIDE 40 MEQ: 1500 TABLET, EXTENDED RELEASE ORAL at 12:15

## 2021-02-17 RX ADMIN — DEXTROSE AND SODIUM CHLORIDE 125 ML/HR: 5; 900 INJECTION, SOLUTION INTRAVENOUS at 02:10

## 2021-02-17 RX ADMIN — DEXTROSE AND SODIUM CHLORIDE 125 ML/HR: 5; 900 INJECTION, SOLUTION INTRAVENOUS at 08:21

## 2021-02-17 RX ADMIN — LORAZEPAM 2 MG: 2 INJECTION INTRAMUSCULAR; INTRAVENOUS at 04:59

## 2021-02-17 RX ADMIN — ENOXAPARIN SODIUM 40 MG: 40 INJECTION SUBCUTANEOUS at 15:42

## 2021-02-17 RX ADMIN — PIPERACILLIN AND TAZOBACTAM 3.38 G: 3; .375 INJECTION, POWDER, LYOPHILIZED, FOR SOLUTION INTRAVENOUS at 14:45

## 2021-02-17 RX ADMIN — FOLIC ACID 1 MG: 1 TABLET ORAL at 08:05

## 2021-02-18 ENCOUNTER — READMISSION MANAGEMENT (OUTPATIENT)
Dept: CALL CENTER | Facility: HOSPITAL | Age: 41
End: 2021-02-18

## 2021-02-18 VITALS
DIASTOLIC BLOOD PRESSURE: 81 MMHG | OXYGEN SATURATION: 100 % | SYSTOLIC BLOOD PRESSURE: 115 MMHG | TEMPERATURE: 98.4 F | WEIGHT: 143.3 LBS | RESPIRATION RATE: 14 BRPM | HEIGHT: 72 IN | BODY MASS INDEX: 19.41 KG/M2 | HEART RATE: 100 BPM

## 2021-02-18 PROBLEM — J96.01 ACUTE HYPOXEMIC RESPIRATORY FAILURE (HCC): Status: RESOLVED | Noted: 2021-02-17 | Resolved: 2021-02-18

## 2021-02-18 PROBLEM — F10.931 DELIRIUM TREMENS (HCC): Status: RESOLVED | Noted: 2021-02-15 | Resolved: 2021-02-18

## 2021-02-18 PROBLEM — J69.0 ASPIRATION PNEUMONIA (HCC): Status: ACTIVE | Noted: 2021-02-18

## 2021-02-18 LAB
ANION GAP SERPL CALCULATED.3IONS-SCNC: 9 MMOL/L (ref 5–15)
BASOPHILS # BLD AUTO: 0 10*3/MM3 (ref 0–0.2)
BASOPHILS NFR BLD AUTO: 0.3 % (ref 0–1.5)
BUN SERPL-MCNC: <2 MG/DL (ref 6–20)
BUN/CREAT SERPL: ABNORMAL
CALCIUM SPEC-SCNC: 8.8 MG/DL (ref 8.6–10.5)
CHLORIDE SERPL-SCNC: 107 MMOL/L (ref 98–107)
CO2 SERPL-SCNC: 22 MMOL/L (ref 22–29)
CREAT SERPL-MCNC: 0.61 MG/DL (ref 0.76–1.27)
DEPRECATED RDW RBC AUTO: 57.3 FL (ref 37–54)
EOSINOPHIL # BLD AUTO: 0 10*3/MM3 (ref 0–0.4)
EOSINOPHIL NFR BLD AUTO: 0.7 % (ref 0.3–6.2)
ERYTHROCYTE [DISTWIDTH] IN BLOOD BY AUTOMATED COUNT: 16 % (ref 12.3–15.4)
GFR SERPL CREATININE-BSD FRML MDRD: 146 ML/MIN/1.73
GLUCOSE SERPL-MCNC: 119 MG/DL (ref 65–99)
HCT VFR BLD AUTO: 32.4 % (ref 37.5–51)
HGB BLD-MCNC: 11.4 G/DL (ref 13–17.7)
HOLD SPECIMEN: NORMAL
LYMPHOCYTES # BLD AUTO: 1.1 10*3/MM3 (ref 0.7–3.1)
LYMPHOCYTES NFR BLD AUTO: 24.8 % (ref 19.6–45.3)
MCH RBC QN AUTO: 36 PG (ref 26.6–33)
MCHC RBC AUTO-ENTMCNC: 35.3 G/DL (ref 31.5–35.7)
MCV RBC AUTO: 102.2 FL (ref 79–97)
MONOCYTES # BLD AUTO: 0.8 10*3/MM3 (ref 0.1–0.9)
MONOCYTES NFR BLD AUTO: 17 % (ref 5–12)
NEUTROPHILS NFR BLD AUTO: 2.6 10*3/MM3 (ref 1.7–7)
NEUTROPHILS NFR BLD AUTO: 57.2 % (ref 42.7–76)
NRBC BLD AUTO-RTO: 0.1 /100 WBC (ref 0–0.2)
PLATELET # BLD AUTO: 154 10*3/MM3 (ref 140–450)
PMV BLD AUTO: 7.6 FL (ref 6–12)
POTASSIUM SERPL-SCNC: 2.8 MMOL/L (ref 3.5–5.2)
RBC # BLD AUTO: 3.17 10*6/MM3 (ref 4.14–5.8)
SODIUM SERPL-SCNC: 138 MMOL/L (ref 136–145)
WBC # BLD AUTO: 4.5 10*3/MM3 (ref 3.4–10.8)

## 2021-02-18 PROCEDURE — 80048 BASIC METABOLIC PNL TOTAL CA: CPT | Performed by: HOSPITALIST

## 2021-02-18 PROCEDURE — 25010000002 PIPERACILLIN SOD-TAZOBACTAM PER 1 G: Performed by: HOSPITALIST

## 2021-02-18 PROCEDURE — 85025 COMPLETE CBC W/AUTO DIFF WBC: CPT | Performed by: HOSPITALIST

## 2021-02-18 PROCEDURE — 99238 HOSP IP/OBS DSCHRG MGMT 30/<: CPT | Performed by: HOSPITALIST

## 2021-02-18 RX ORDER — FOLIC ACID 1 MG/1
1 TABLET ORAL DAILY
Qty: 30 TABLET | Refills: 0 | Status: ON HOLD | OUTPATIENT
Start: 2021-02-19 | End: 2022-06-15

## 2021-02-18 RX ORDER — AMOXICILLIN AND CLAVULANATE POTASSIUM 875; 125 MG/1; MG/1
1 TABLET, FILM COATED ORAL 2 TIMES DAILY
Qty: 8 TABLET | Refills: 0 | Status: SHIPPED | OUTPATIENT
Start: 2021-02-18 | End: 2021-02-22

## 2021-02-18 RX ADMIN — PIPERACILLIN AND TAZOBACTAM 3.38 G: 3; .375 INJECTION, POWDER, LYOPHILIZED, FOR SOLUTION INTRAVENOUS at 06:15

## 2021-02-18 RX ADMIN — FOLIC ACID 1 MG: 1 TABLET ORAL at 08:05

## 2021-02-18 RX ADMIN — DEXTROSE AND SODIUM CHLORIDE 125 ML/HR: 5; 900 INJECTION, SOLUTION INTRAVENOUS at 04:05

## 2021-02-18 RX ADMIN — Medication 100 MG: at 08:05

## 2021-02-18 RX ADMIN — Medication 10 ML: at 08:06

## 2021-02-18 RX ADMIN — POTASSIUM CHLORIDE 40 MEQ: 1500 TABLET, EXTENDED RELEASE ORAL at 10:55

## 2021-02-18 RX ADMIN — CLONIDINE HYDROCHLORIDE 0.1 MG: 0.1 TABLET ORAL at 08:15

## 2021-02-18 NOTE — OUTREACH NOTE
Prep Survey      Responses   [facility]   Is LACE score < 7 ?  No   Emergency Room discharge w/ pulse ox?  No   Eligibility  TCM   Hospital  [facility]   Date of Admission  02/15/21   Date of Discharge  02/18/21   Discharge Disposition  Home or Self Care   Discharge diagnosis  Delirium tremens/Seizure/Asp PNA   Does the patient have one of the following disease processes/diagnoses(primary or secondary)?  COPD/Pneumonia   Does the patient have Home health ordered?  No   Is there a DME ordered?  No   Prep survey completed?  Yes          Esthela Thompson RN

## 2021-02-19 ENCOUNTER — TRANSITIONAL CARE MANAGEMENT TELEPHONE ENCOUNTER (OUTPATIENT)
Dept: CALL CENTER | Facility: HOSPITAL | Age: 41
End: 2021-02-19

## 2021-02-19 NOTE — OUTREACH NOTE
Call Center TCM Note      Responses   [facility]   Does the patient have one of the following disease processes/diagnoses(primary or secondary)?  COPD/Pneumonia   TCM attempt successful?  No [No verbal release]   Unsuccessful attempts  Attempt 1          Linn Romo RN    2/19/2021, 08:44 EST

## 2021-02-19 NOTE — OUTREACH NOTE
Call Center TCM Note      Responses   [facility] patient discharged from?  [facility]   Does the patient have one of the following disease processes/diagnoses(primary or secondary)?  COPD/Pneumonia   Was the primary reason for admission:  Pneumonia   TCM attempt successful?  Yes   Call start time  1020   Call end time  1023   Discharge diagnosis  Alcohol withdrawal,  Delirium tremens/Seizure/Asp PNA   Person spoke with today (if not patient) and relationship  Ladi-wife   Does the patient have a primary care provider?   Yes   Has the patient kept scheduled appointments due by today?  N/A   Psychosocial issues?  Yes   Psychosocial comments  alcohol withdrawal and according to ED note he's positive for amphetamines/methamphetamines but Ladi doens't know that.    Comments  Ladi was tearful during conversation. She verbalized she has him in the car and she's driving him around as she doesn't know what to do.    What is the patient's perception of their health status since discharge?  Worsening [Ladi reports pt is awake and alert but he's not coherent. ]   TCM call completed?  Yes   Wrap up additional comments  RN advised Ladi to take patient back to the ER. She verbalized understanding.           Linn Romo, JONN    2/19/2021, 10:27 EST

## 2021-02-20 ENCOUNTER — APPOINTMENT (OUTPATIENT)
Dept: GENERAL RADIOLOGY | Facility: HOSPITAL | Age: 41
End: 2021-02-20

## 2021-02-20 ENCOUNTER — HOSPITAL ENCOUNTER (EMERGENCY)
Facility: HOSPITAL | Age: 41
Discharge: HOME | End: 2021-02-20
Admitting: EMERGENCY MEDICINE

## 2021-02-20 VITALS
HEART RATE: 100 BPM | OXYGEN SATURATION: 100 % | RESPIRATION RATE: 18 BRPM | SYSTOLIC BLOOD PRESSURE: 117 MMHG | TEMPERATURE: 97.7 F | BODY MASS INDEX: 21.35 KG/M2 | WEIGHT: 157.63 LBS | DIASTOLIC BLOOD PRESSURE: 85 MMHG | HEIGHT: 72 IN

## 2021-02-20 DIAGNOSIS — F10.10 ALCOHOL ABUSE: Primary | ICD-10-CM

## 2021-02-20 PROCEDURE — 99281 EMR DPT VST MAYX REQ PHY/QHP: CPT

## 2021-02-20 RX ORDER — SODIUM CHLORIDE 0.9 % (FLUSH) 0.9 %
10 SYRINGE (ML) INJECTION AS NEEDED
Status: DISCONTINUED | OUTPATIENT
Start: 2021-02-20 | End: 2021-02-20 | Stop reason: HOSPADM

## 2021-02-20 NOTE — ED NOTES
Pt's family states pt was discharged from IP here 2 days ago for alcohol withdrawals; hospitals nurse called today to check on pt and was directed to ED.     Graciela Keen RN  02/20/21 7846

## 2021-02-20 NOTE — ED PROVIDER NOTES
Subjective   Patient is 40-year-old male who presents stating that he was contacted by 70 from the hospital a few days ago for follow-up after being recently discharged for alcohol withdrawal and was advised to come back to the ED.  Patient states he is unsure why.  Patient states at time of discharge he was feeling better.  He said since being discharged she denies any abdominal pain, nausea, vomiting, chest pain, shortness of breath.  Patient states that he did drink half a pint of alcohol today.  He reports that he supposed to go to [facility] inpatient rehab on Monday.  He does not want any current help for his alcohol abuse.  And currently has no complaints.  He denies any suicidal or homicidal ideations.          Review of Systems   Constitutional: Negative.    HENT: Negative.    Respiratory: Negative.    Cardiovascular: Negative.    Gastrointestinal: Negative.    Skin: Negative.    Neurological: Negative.    Hematological: Negative.    Psychiatric/Behavioral: Negative.        Past Medical History:   Diagnosis Date   • Substance abuse (CMS/HCC)        No Known Allergies    Past Surgical History:   Procedure Laterality Date   • FRACTURE SURGERY         Family History   Problem Relation Age of Onset   • No Known Problems Mother    • No Known Problems Father        Social History     Socioeconomic History   • Marital status:      Spouse name: Not on file   • Number of children: Not on file   • Years of education: Not on file   • Highest education level: Not on file   Tobacco Use   • Smoking status: Current Every Day Smoker     Packs/day: 1.00   Substance and Sexual Activity   • Alcohol use: Yes     Frequency: 4 or more times a week     Drinks per session: 10 or more     Binge frequency: Daily or almost daily   • Drug use: Not Currently   • Sexual activity: Yes     Partners: Female           Objective   Physical Exam  Vitals signs and nursing note reviewed.   Constitutional:       General: He is not in acute  "distress.     Appearance: He is well-developed. He is not ill-appearing, toxic-appearing or diaphoretic.   HENT:      Head: Normocephalic and atraumatic.      Mouth/Throat:      Mouth: Mucous membranes are moist.      Pharynx: Oropharynx is clear.   Eyes:      General: No scleral icterus.     Extraocular Movements: Extraocular movements intact.      Pupils: Pupils are equal, round, and reactive to light.   Neck:      Musculoskeletal: Normal range of motion and neck supple.   Cardiovascular:      Rate and Rhythm: Normal rate and regular rhythm.      Pulses: Normal pulses.      Heart sounds: Normal heart sounds. No murmur. No friction rub. No gallop.    Pulmonary:      Effort: Pulmonary effort is normal. No tachypnea or accessory muscle usage.      Breath sounds: Normal breath sounds. No stridor. No decreased breath sounds, wheezing, rhonchi or rales.   Chest:      Chest wall: No mass, deformity, tenderness or crepitus.   Abdominal:      General: Bowel sounds are normal. There is no distension.      Palpations: Abdomen is soft. There is no hepatomegaly, splenomegaly or mass.      Tenderness: There is no abdominal tenderness. There is no right CVA tenderness, left CVA tenderness, guarding or rebound.      Hernia: No hernia is present.   Skin:     General: Skin is warm.      Capillary Refill: Capillary refill takes less than 2 seconds.      Findings: No rash.   Neurological:      Mental Status: He is alert and oriented to person, place, and time.      GCS: GCS eye subscore is 4. GCS verbal subscore is 5. GCS motor subscore is 6.      Cranial Nerves: Cranial nerves are intact.      Comments: Moving all extremities freely   Psychiatric:         Mood and Affect: Mood normal.         Behavior: Behavior normal.         Procedures           ED Course      /85 (BP Location: Left arm, Patient Position: Sitting)   Pulse 100   Temp 97.7 °F (36.5 °C) (Oral)   Resp 18   Ht 182.9 cm (72\")   Wt 71.5 kg (157 lb 10.1 oz)   " SpO2 100%   BMI 21.38 kg/m²   Medications   sodium chloride 0.9 % flush 10 mL (has no administration in time range)     Labs Reviewed   COMPREHENSIVE METABOLIC PANEL   ETHANOL   CBC WITH AUTO DIFFERENTIAL   CBC AND DIFFERENTIAL    Narrative:     The following orders were created for panel order CBC & Differential.  Procedure                               Abnormality         Status                     ---------                               -----------         ------                     CBC Auto Differential[[id number]]                                                         Please view results for these tests on the individual orders.     No radiology results for the last day                                       MDM  Number of Diagnoses or Management Options  Alcohol abuse:   Diagnosis management comments: Chart Review:  Comorbidity: As per past medical history  Disposition/Treatment:  Appropriate PPE was worn during exam and throughout all encounters with the patient.  While in the ED patient was alert and oriented patient had no complaints while in the ED and was not really sure while he was here.  Patient states he is going to inpatient rehab at [facility] on Monday old charts were reviewed looks like there was an out reach call from Linn Romo RN yesterday to see how the patient was doing she spoke to the patient's wife Ladi who was tearful during the conversation and states that he was awake and alert but not coherent the nurse advised her to bring him to the ED yesterday for further evaluation and management.  When I went back into the room to discuss this with the patient and to see if I could contact his wife he had eloped.  He had no seizure-like activity or signs of withdrawal while in the ED.      Final diagnoses:   Alcohol abuse            Boogie Castillo PA  02/20/21 2234

## 2021-02-22 LAB
BACTERIA SPEC AEROBE CULT: NORMAL
BACTERIA SPEC AEROBE CULT: NORMAL

## 2021-02-25 ENCOUNTER — READMISSION MANAGEMENT (OUTPATIENT)
Dept: CALL CENTER | Facility: HOSPITAL | Age: 41
End: 2021-02-25

## 2021-02-25 NOTE — OUTREACH NOTE
COPD/PN Week 2 Survey      Responses   [facility] patient discharged from?  [facility]   Does the patient have one of the following disease processes/diagnoses(primary or secondary)?  COPD/Pneumonia   Week 2 attempt successful?  No   Unsuccessful attempts  Attempt 1          Sandra Blakely RN

## 2021-03-02 ENCOUNTER — READMISSION MANAGEMENT (OUTPATIENT)
Dept: CALL CENTER | Facility: HOSPITAL | Age: 41
End: 2021-03-02

## 2021-03-02 NOTE — OUTREACH NOTE
COPD/PN Week 2 Survey      Responses   [facility] patient discharged from?  [facility]   Does the patient have one of the following disease processes/diagnoses(primary or secondary)?  COPD/Pneumonia   Was the primary reason for admission:  Pneumonia   Week 2 attempt successful?  No   Unsuccessful attempts  Attempt 2   Rescheduled  Revoked   Revoke  Decline to participate          Palma Islas RN

## 2021-04-16 ENCOUNTER — HOSPITAL ENCOUNTER (EMERGENCY)
Facility: HOSPITAL | Age: 41
Discharge: HOME | End: 2021-04-16
Attending: EMERGENCY MEDICINE | Admitting: EMERGENCY MEDICINE

## 2021-04-16 VITALS
DIASTOLIC BLOOD PRESSURE: 84 MMHG | WEIGHT: 160 LBS | TEMPERATURE: 98.3 F | SYSTOLIC BLOOD PRESSURE: 129 MMHG | OXYGEN SATURATION: 99 % | HEIGHT: 72 IN | BODY MASS INDEX: 21.67 KG/M2 | RESPIRATION RATE: 18 BRPM | HEART RATE: 103 BPM

## 2021-04-16 DIAGNOSIS — F10.930 ALCOHOL WITHDRAWAL SYNDROME WITHOUT COMPLICATION (HCC): Primary | ICD-10-CM

## 2021-04-16 PROCEDURE — 25010000002 PROCHLORPERAZINE 10 MG/2ML SOLUTION: Performed by: EMERGENCY MEDICINE

## 2021-04-16 PROCEDURE — 99284 EMERGENCY DEPT VISIT MOD MDM: CPT

## 2021-04-16 PROCEDURE — 96374 THER/PROPH/DIAG INJ IV PUSH: CPT

## 2021-04-16 RX ORDER — PROCHLORPERAZINE EDISYLATE 5 MG/ML
5 INJECTION INTRAMUSCULAR; INTRAVENOUS ONCE
Status: COMPLETED | OUTPATIENT
Start: 2021-04-16 | End: 2021-04-16

## 2021-04-16 RX ORDER — SODIUM CHLORIDE 0.9 % (FLUSH) 0.9 %
10 SYRINGE (ML) INJECTION AS NEEDED
Status: DISCONTINUED | OUTPATIENT
Start: 2021-04-16 | End: 2021-04-16 | Stop reason: HOSPADM

## 2021-04-16 RX ORDER — CHLORDIAZEPOXIDE HYDROCHLORIDE 25 MG/1
25 CAPSULE, GELATIN COATED ORAL 3 TIMES DAILY PRN
Qty: 12 CAPSULE | Refills: 0 | Status: ON HOLD | OUTPATIENT
Start: 2021-04-16 | End: 2022-06-15

## 2021-04-16 RX ADMIN — SODIUM CHLORIDE 1000 ML: 9 INJECTION, SOLUTION INTRAVENOUS at 02:36

## 2021-04-16 RX ADMIN — PROCHLORPERAZINE EDISYLATE 5 MG: 5 INJECTION INTRAMUSCULAR; INTRAVENOUS at 02:36

## 2021-04-16 NOTE — ED PROVIDER NOTES
Subjective   Patient is a 40-year-old male who states he feels like he is withdrawing from alcohol.  Last time he drank was approximately 24 hours ago.  He states that he drinks 1/5 of whiskey per day for the past several months.  He denies fever vomiting or diarrhea.  He does complain of nausea.          Review of Systems  Negative for headache ears no cough fever chest pain shortness of breath vomiting diarrhea dysuria or other complaint  Past Medical History:   Diagnosis Date   • Substance abuse (CMS/HCC)        No Known Allergies    Past Surgical History:   Procedure Laterality Date   • FRACTURE SURGERY         Family History   Problem Relation Age of Onset   • No Known Problems Mother    • No Known Problems Father        Social History     Socioeconomic History   • Marital status:      Spouse name: Not on file   • Number of children: Not on file   • Years of education: Not on file   • Highest education level: Not on file   Tobacco Use   • Smoking status: Current Every Day Smoker     Packs/day: 1.00   Substance and Sexual Activity   • Alcohol use: Yes   • Drug use: Not Currently   • Sexual activity: Yes     Partners: Female           Objective   Physical Exam  HEENT exam shows TMs to be clear.  Oropharynx comers.  Neck has no adenopathy.  Lungs are clear.  Heart has regular rhythm.  Chest is nontender.  Ab soft nontender.  Extremity exam is unremarkable.  Procedures           ED Course                                           MDM  Number of Diagnoses or Management Options  Diagnosis management comments: Patient was given IV fluids as well as Compazine IV.  On reexam feels much improved.  Will be discharged with a prescription for Librium.  We will see his MD for recheck as needed.    Risk of Complications, Morbidity, and/or Mortality  Presenting problems: moderate  Diagnostic procedures: moderate  Management options: moderate    Patient Progress  Patient progress: stable      Final diagnoses:   Alcohol  withdrawal syndrome without complication (CMS/HCC)       ED Disposition  ED Disposition     ED Disposition Condition Comment    Discharge Stable           No follow-up provider specified.       Medication List      New Prescriptions    chlordiazePOXIDE 25 MG capsule  Commonly known as: LIBRIUM  Take 1 capsule by mouth 3 (Three) Times a Day As Needed for Anxiety.           Where to Get Your Medications      These medications were sent to [id number] - [place], [place] - [address] [facility] [address] - [phone]  - [phone]   [address] [place] [address]    Phone: [phone]   · chlordiazePOXIDE 25 MG capsule          Ameya Pimentel MD  04/16/21 1412

## 2021-04-19 ENCOUNTER — HOSPITAL ENCOUNTER (EMERGENCY)
Facility: HOSPITAL | Age: 41
Discharge: LEFT BEFORE DISCHARGE | End: 2021-04-19
Attending: EMERGENCY MEDICINE

## 2021-04-19 VITALS
TEMPERATURE: 97.8 F | OXYGEN SATURATION: 98 % | SYSTOLIC BLOOD PRESSURE: 130 MMHG | HEIGHT: 70 IN | HEART RATE: 98 BPM | WEIGHT: 160 LBS | BODY MASS INDEX: 22.9 KG/M2 | RESPIRATION RATE: 16 BRPM | DIASTOLIC BLOOD PRESSURE: 85 MMHG

## 2021-04-19 PROCEDURE — 99211 OFF/OP EST MAY X REQ PHY/QHP: CPT | Performed by: EMERGENCY MEDICINE
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
## 2021-08-13 ENCOUNTER — IMMUNIZATION (OUTPATIENT)
Dept: VACCINE CLINIC | Facility: HOSPITAL | Age: 41
End: 2021-08-13

## 2021-08-13 PROCEDURE — 91300 HC SARSCOV02 VAC 30MCG/0.3ML IM: CPT | Performed by: INTERNAL MEDICINE

## 2021-08-13 PROCEDURE — 0001A: CPT | Performed by: INTERNAL MEDICINE

## 2021-09-03 ENCOUNTER — APPOINTMENT (OUTPATIENT)
Dept: VACCINE CLINIC | Facility: HOSPITAL | Age: 41
End: 2021-09-03

## 2021-09-15 ENCOUNTER — IMMUNIZATION (OUTPATIENT)
Dept: VACCINE CLINIC | Facility: HOSPITAL | Age: 41
End: 2021-09-15

## 2021-09-15 PROCEDURE — 0002A: CPT | Performed by: INTERNAL MEDICINE

## 2021-09-15 PROCEDURE — 91300 HC SARSCOV02 VAC 30MCG/0.3ML IM: CPT | Performed by: INTERNAL MEDICINE

## 2022-06-14 ENCOUNTER — APPOINTMENT (OUTPATIENT)
Dept: CT IMAGING | Facility: HOSPITAL | Age: 42
End: 2022-06-14

## 2022-06-14 ENCOUNTER — HOSPITAL ENCOUNTER (OUTPATIENT)
Facility: HOSPITAL | Age: 42
Setting detail: OBSERVATION
Discharge: HOME | End: 2022-06-16
Attending: EMERGENCY MEDICINE | Admitting: INTERNAL MEDICINE

## 2022-06-14 DIAGNOSIS — R25.1 TREMOR: ICD-10-CM

## 2022-06-14 DIAGNOSIS — E83.42 HYPOMAGNESEMIA: ICD-10-CM

## 2022-06-14 DIAGNOSIS — F10.930 ALCOHOL WITHDRAWAL SEIZURE WITHOUT COMPLICATION: Primary | ICD-10-CM

## 2022-06-14 DIAGNOSIS — E87.6 HYPOKALEMIA: ICD-10-CM

## 2022-06-14 DIAGNOSIS — F10.10 ALCOHOL ABUSE: ICD-10-CM

## 2022-06-14 DIAGNOSIS — R56.9 ALCOHOL WITHDRAWAL SEIZURE WITHOUT COMPLICATION: Primary | ICD-10-CM

## 2022-06-14 LAB
ALBUMIN SERPL-MCNC: 3.8 G/DL (ref 3.5–5.2)
ALBUMIN/GLOB SERPL: 1.2 G/DL
ALP SERPL-CCNC: 301 U/L (ref 39–117)
ALT SERPL W P-5'-P-CCNC: 110 U/L (ref 1–41)
ANION GAP SERPL CALCULATED.3IONS-SCNC: 19 MMOL/L (ref 5–15)
APAP SERPL-MCNC: <5 MCG/ML (ref 0–30)
AST SERPL-CCNC: 248 U/L (ref 1–40)
BASOPHILS # BLD AUTO: 0.1 10*3/MM3 (ref 0–0.2)
BASOPHILS NFR BLD AUTO: 1.2 % (ref 0–1.5)
BILIRUB SERPL-MCNC: 3.8 MG/DL (ref 0–1.2)
BUN SERPL-MCNC: 11 MG/DL (ref 6–20)
BUN/CREAT SERPL: 12.4 (ref 7–25)
CALCIUM SPEC-SCNC: 8.8 MG/DL (ref 8.6–10.5)
CHLORIDE SERPL-SCNC: 95 MMOL/L (ref 98–107)
CK SERPL-CCNC: 163 U/L (ref 20–200)
CO2 SERPL-SCNC: 24 MMOL/L (ref 22–29)
CREAT SERPL-MCNC: 0.89 MG/DL (ref 0.76–1.27)
DEPRECATED RDW RBC AUTO: 58.2 FL (ref 37–54)
EGFRCR SERPLBLD CKD-EPI 2021: 110.4 ML/MIN/1.73
EOSINOPHIL # BLD AUTO: 0 10*3/MM3 (ref 0–0.4)
EOSINOPHIL NFR BLD AUTO: 0.1 % (ref 0.3–6.2)
ERYTHROCYTE [DISTWIDTH] IN BLOOD BY AUTOMATED COUNT: 16.2 % (ref 12.3–15.4)
ETHANOL UR QL: <0.01 %
FERRITIN SERPL-MCNC: 3062 NG/ML (ref 30–400)
GLOBULIN UR ELPH-MCNC: 3.3 GM/DL
GLUCOSE BLDC GLUCOMTR-MCNC: 131 MG/DL (ref 70–105)
GLUCOSE SERPL-MCNC: 140 MG/DL (ref 65–99)
HCT VFR BLD AUTO: 35.2 % (ref 37.5–51)
HGB BLD-MCNC: 12.2 G/DL (ref 13–17.7)
HOLD SPECIMEN: NORMAL
HOLD SPECIMEN: NORMAL
IRON 24H UR-MRATE: 164 MCG/DL (ref 59–158)
IRON SATN MFR SERPL: 74 % (ref 20–50)
LYMPHOCYTES # BLD AUTO: 1 10*3/MM3 (ref 0.7–3.1)
LYMPHOCYTES NFR BLD AUTO: 13 % (ref 19.6–45.3)
MAGNESIUM SERPL-MCNC: 1.3 MG/DL (ref 1.6–2.6)
MCH RBC QN AUTO: 35.4 PG (ref 26.6–33)
MCHC RBC AUTO-ENTMCNC: 34.6 G/DL (ref 31.5–35.7)
MCV RBC AUTO: 102.4 FL (ref 79–97)
MONOCYTES # BLD AUTO: 0.8 10*3/MM3 (ref 0.1–0.9)
MONOCYTES NFR BLD AUTO: 10.4 % (ref 5–12)
NEUTROPHILS NFR BLD AUTO: 5.7 10*3/MM3 (ref 1.7–7)
NEUTROPHILS NFR BLD AUTO: 75.3 % (ref 42.7–76)
NRBC BLD AUTO-RTO: 0.1 /100 WBC (ref 0–0.2)
PHOSPHATE SERPL-MCNC: 1.9 MG/DL (ref 2.5–4.5)
PLATELET # BLD AUTO: 234 10*3/MM3 (ref 140–450)
PMV BLD AUTO: 7.9 FL (ref 6–12)
POTASSIUM SERPL-SCNC: 3.1 MMOL/L (ref 3.5–5.2)
PROT SERPL-MCNC: 7.1 G/DL (ref 6–8.5)
RBC # BLD AUTO: 3.44 10*6/MM3 (ref 4.14–5.8)
SALICYLATES SERPL-MCNC: 0.4 MG/DL
SODIUM SERPL-SCNC: 138 MMOL/L (ref 136–145)
TIBC SERPL-MCNC: 221 MCG/DL (ref 298–536)
TRANSFERRIN SERPL-MCNC: 148 MG/DL (ref 200–360)
TROPONIN T SERPL-MCNC: <0.01 NG/ML (ref 0–0.03)
WBC NRBC COR # BLD: 7.5 10*3/MM3 (ref 3.4–10.8)
WHOLE BLOOD HOLD COAG: NORMAL

## 2022-06-14 PROCEDURE — 25010000002 LORAZEPAM PER 2 MG: Performed by: HOSPITALIST

## 2022-06-14 PROCEDURE — 96376 TX/PRO/DX INJ SAME DRUG ADON: CPT

## 2022-06-14 PROCEDURE — G0378 HOSPITAL OBSERVATION PER HR: HCPCS

## 2022-06-14 PROCEDURE — 70450 CT HEAD/BRAIN W/O DYE: CPT

## 2022-06-14 PROCEDURE — 82550 ASSAY OF CK (CPK): CPT | Performed by: PHYSICIAN ASSISTANT

## 2022-06-14 PROCEDURE — 96368 THER/DIAG CONCURRENT INF: CPT

## 2022-06-14 PROCEDURE — 83735 ASSAY OF MAGNESIUM: CPT | Performed by: PHYSICIAN ASSISTANT

## 2022-06-14 PROCEDURE — 80179 DRUG ASSAY SALICYLATE: CPT | Performed by: PHYSICIAN ASSISTANT

## 2022-06-14 PROCEDURE — 25010000002 MAGNESIUM SULFATE 2 GM/50ML SOLUTION: Performed by: PHYSICIAN ASSISTANT

## 2022-06-14 PROCEDURE — 84484 ASSAY OF TROPONIN QUANT: CPT | Performed by: PHYSICIAN ASSISTANT

## 2022-06-14 PROCEDURE — 96365 THER/PROPH/DIAG IV INF INIT: CPT

## 2022-06-14 PROCEDURE — 99220 PR INITIAL OBSERVATION CARE/DAY 70 MINUTES: CPT | Performed by: HOSPITALIST

## 2022-06-14 PROCEDURE — 25010000002 LORAZEPAM PER 2 MG: Performed by: PHYSICIAN ASSISTANT

## 2022-06-14 PROCEDURE — 74177 CT ABD & PELVIS W/CONTRAST: CPT

## 2022-06-14 PROCEDURE — 85025 COMPLETE CBC W/AUTO DIFF WBC: CPT | Performed by: PHYSICIAN ASSISTANT

## 2022-06-14 PROCEDURE — 82607 VITAMIN B-12: CPT | Performed by: HOSPITALIST

## 2022-06-14 PROCEDURE — 96375 TX/PRO/DX INJ NEW DRUG ADDON: CPT

## 2022-06-14 PROCEDURE — 83540 ASSAY OF IRON: CPT | Performed by: HOSPITALIST

## 2022-06-14 PROCEDURE — 99284 EMERGENCY DEPT VISIT MOD MDM: CPT

## 2022-06-14 PROCEDURE — 82746 ASSAY OF FOLIC ACID SERUM: CPT | Performed by: HOSPITALIST

## 2022-06-14 PROCEDURE — 84100 ASSAY OF PHOSPHORUS: CPT | Performed by: HOSPITALIST

## 2022-06-14 PROCEDURE — 25010000002 THIAMINE PER 100 MG: Performed by: PHYSICIAN ASSISTANT

## 2022-06-14 PROCEDURE — 82728 ASSAY OF FERRITIN: CPT | Performed by: HOSPITALIST

## 2022-06-14 PROCEDURE — 82077 ASSAY SPEC XCP UR&BREATH IA: CPT | Performed by: PHYSICIAN ASSISTANT

## 2022-06-14 PROCEDURE — 80143 DRUG ASSAY ACETAMINOPHEN: CPT | Performed by: PHYSICIAN ASSISTANT

## 2022-06-14 PROCEDURE — 82962 GLUCOSE BLOOD TEST: CPT

## 2022-06-14 PROCEDURE — 93005 ELECTROCARDIOGRAM TRACING: CPT | Performed by: PHYSICIAN ASSISTANT

## 2022-06-14 PROCEDURE — 0 IOPAMIDOL PER 1 ML: Performed by: EMERGENCY MEDICINE

## 2022-06-14 PROCEDURE — 80053 COMPREHEN METABOLIC PANEL: CPT | Performed by: PHYSICIAN ASSISTANT

## 2022-06-14 PROCEDURE — 84466 ASSAY OF TRANSFERRIN: CPT | Performed by: HOSPITALIST

## 2022-06-14 RX ORDER — SODIUM CHLORIDE 0.9 % (FLUSH) 0.9 %
10 SYRINGE (ML) INJECTION EVERY 12 HOURS SCHEDULED
Status: DISCONTINUED | OUTPATIENT
Start: 2022-06-14 | End: 2022-06-16 | Stop reason: HOSPADM

## 2022-06-14 RX ORDER — SODIUM CHLORIDE 0.9 % (FLUSH) 0.9 %
10 SYRINGE (ML) INJECTION AS NEEDED
Status: DISCONTINUED | OUTPATIENT
Start: 2022-06-14 | End: 2022-06-16 | Stop reason: HOSPADM

## 2022-06-14 RX ORDER — LORAZEPAM 1 MG/1
1 TABLET ORAL
Status: DISCONTINUED | OUTPATIENT
Start: 2022-06-14 | End: 2022-06-16 | Stop reason: HOSPADM

## 2022-06-14 RX ORDER — POTASSIUM CHLORIDE 20 MEQ/1
40 TABLET, EXTENDED RELEASE ORAL AS NEEDED
Status: DISCONTINUED | OUTPATIENT
Start: 2022-06-14 | End: 2022-06-16 | Stop reason: HOSPADM

## 2022-06-14 RX ORDER — LORAZEPAM 2 MG/ML
1 INJECTION INTRAMUSCULAR
Status: DISCONTINUED | OUTPATIENT
Start: 2022-06-14 | End: 2022-06-16 | Stop reason: HOSPADM

## 2022-06-14 RX ORDER — POTASSIUM CHLORIDE 20 MEQ/1
40 TABLET, EXTENDED RELEASE ORAL EVERY 4 HOURS
Status: ACTIVE | OUTPATIENT
Start: 2022-06-14 | End: 2022-06-15

## 2022-06-14 RX ORDER — CHOLECALCIFEROL (VITAMIN D3) 125 MCG
5 CAPSULE ORAL NIGHTLY PRN
Status: DISCONTINUED | OUTPATIENT
Start: 2022-06-14 | End: 2022-06-16 | Stop reason: HOSPADM

## 2022-06-14 RX ORDER — ALUMINA, MAGNESIA, AND SIMETHICONE 2400; 2400; 240 MG/30ML; MG/30ML; MG/30ML
15 SUSPENSION ORAL EVERY 6 HOURS PRN
Status: DISCONTINUED | OUTPATIENT
Start: 2022-06-14 | End: 2022-06-16 | Stop reason: HOSPADM

## 2022-06-14 RX ORDER — ACETAMINOPHEN 325 MG/1
650 TABLET ORAL EVERY 4 HOURS PRN
Status: DISCONTINUED | OUTPATIENT
Start: 2022-06-14 | End: 2022-06-16 | Stop reason: HOSPADM

## 2022-06-14 RX ORDER — MAGNESIUM SULFATE 1 G/100ML
1 INJECTION INTRAVENOUS AS NEEDED
Status: DISCONTINUED | OUTPATIENT
Start: 2022-06-14 | End: 2022-06-16 | Stop reason: HOSPADM

## 2022-06-14 RX ORDER — ACETAMINOPHEN 650 MG/1
650 SUPPOSITORY RECTAL EVERY 4 HOURS PRN
Status: DISCONTINUED | OUTPATIENT
Start: 2022-06-14 | End: 2022-06-16 | Stop reason: HOSPADM

## 2022-06-14 RX ORDER — ACETAMINOPHEN 160 MG/5ML
650 SOLUTION ORAL EVERY 4 HOURS PRN
Status: DISCONTINUED | OUTPATIENT
Start: 2022-06-14 | End: 2022-06-16 | Stop reason: HOSPADM

## 2022-06-14 RX ORDER — LORAZEPAM 2 MG/ML
0.5 INJECTION INTRAMUSCULAR ONCE
Status: COMPLETED | OUTPATIENT
Start: 2022-06-14 | End: 2022-06-14

## 2022-06-14 RX ORDER — LORAZEPAM 2 MG/ML
2 INJECTION INTRAMUSCULAR
Status: DISCONTINUED | OUTPATIENT
Start: 2022-06-14 | End: 2022-06-16 | Stop reason: HOSPADM

## 2022-06-14 RX ORDER — ONDANSETRON 4 MG/1
4 TABLET, FILM COATED ORAL EVERY 6 HOURS PRN
Status: DISCONTINUED | OUTPATIENT
Start: 2022-06-14 | End: 2022-06-16 | Stop reason: HOSPADM

## 2022-06-14 RX ORDER — LORAZEPAM 1 MG/1
2 TABLET ORAL
Status: DISCONTINUED | OUTPATIENT
Start: 2022-06-14 | End: 2022-06-16 | Stop reason: HOSPADM

## 2022-06-14 RX ORDER — MAGNESIUM SULFATE HEPTAHYDRATE 40 MG/ML
2 INJECTION, SOLUTION INTRAVENOUS AS NEEDED
Status: DISCONTINUED | OUTPATIENT
Start: 2022-06-14 | End: 2022-06-16 | Stop reason: HOSPADM

## 2022-06-14 RX ORDER — POTASSIUM CHLORIDE 1.5 G/1.77G
40 POWDER, FOR SOLUTION ORAL AS NEEDED
Status: DISCONTINUED | OUTPATIENT
Start: 2022-06-14 | End: 2022-06-16 | Stop reason: HOSPADM

## 2022-06-14 RX ORDER — ONDANSETRON 2 MG/ML
4 INJECTION INTRAMUSCULAR; INTRAVENOUS EVERY 6 HOURS PRN
Status: DISCONTINUED | OUTPATIENT
Start: 2022-06-14 | End: 2022-06-16 | Stop reason: HOSPADM

## 2022-06-14 RX ORDER — MAGNESIUM SULFATE HEPTAHYDRATE 40 MG/ML
2 INJECTION, SOLUTION INTRAVENOUS ONCE
Status: COMPLETED | OUTPATIENT
Start: 2022-06-14 | End: 2022-06-14

## 2022-06-14 RX ADMIN — LORAZEPAM 0.5 MG: 2 INJECTION INTRAMUSCULAR; INTRAVENOUS at 18:19

## 2022-06-14 RX ADMIN — LORAZEPAM 1 MG: 2 INJECTION INTRAMUSCULAR; INTRAVENOUS at 23:42

## 2022-06-14 RX ADMIN — MAGNESIUM SULFATE HEPTAHYDRATE 2 G: 2 INJECTION, SOLUTION INTRAVENOUS at 19:28

## 2022-06-14 RX ADMIN — IOPAMIDOL 100 ML: 755 INJECTION, SOLUTION INTRAVENOUS at 18:49

## 2022-06-14 RX ADMIN — THIAMINE HYDROCHLORIDE 1000 ML/HR: 100 INJECTION, SOLUTION INTRAMUSCULAR; INTRAVENOUS at 19:28

## 2022-06-14 NOTE — ED NOTES
Pt arrives via EMS with c/o witnessed seizure at home. Pt states he is withdrawing from alcohol, he reports last drink was 2 days ago. Pt has tremors and vomited x 1. Side rails padded, call system in reach.

## 2022-06-14 NOTE — ED PROVIDER NOTES
"Subjective   Chief Complaint: Seizure, alcohol withdrawal    Patient is a 41-year-old  male with history of alcohol abuse presents to the ER with complaints of reported seizure today that he attributes to possible alcohol withdrawal.  Patient states he was laying on his couch today, states that his wife witnessed a seizure that lasted \"only a few seconds\" today.  Patient reports only one other seizure previously but states this was not alcohol related.  Patient states his last drink was about 3 days ago.  Patient states he drinks about 1 pint of liquor daily for the last year.  Patient has before this he would drink excessive amounts of beer.  Patient states he quit cold turkey 3 days ago.  He denies any chest pain shortness of breath headache or dizziness.  He denies any blurry vision lightheadedness slurred speech or numbness or tingling.  He denies any abdominal pain, nausea vomiting or diarrhea.  He does report some tremors intermittently.  Patient offers no other complaints at this time he reports that he feels fine since arriving in the ER.  Patient states he did not feel a seizure coming on.  No fever chills.  Patient states he is interested in rehab assistance for alcohol abuse.  He denies any suicidal homicidal ideations.    PCP: Gibson Benoit      History provided by:  Patient      Review of Systems   Constitutional: Negative for chills and fever.   HENT: Negative for congestion, sore throat and trouble swallowing.    Eyes: Negative.    Respiratory: Negative for shortness of breath and wheezing.    Cardiovascular: Negative for chest pain.   Gastrointestinal: Negative for abdominal pain, diarrhea, nausea and vomiting.   Endocrine: Negative.    Genitourinary: Negative for dysuria.   Musculoskeletal: Positive for myalgias.   Skin: Negative for rash.   Allergic/Immunologic: Negative.    Neurological: Positive for tremors, seizures and light-headedness. Negative for dizziness and headaches. "   Psychiatric/Behavioral: Negative for behavioral problems.   All other systems reviewed and are negative.      Past Medical History:   Diagnosis Date   • Substance abuse (CMS/HCC)        No Known Allergies    Past Surgical History:   Procedure Laterality Date   • FRACTURE SURGERY         Family History   Problem Relation Age of Onset   • No Known Problems Mother    • No Known Problems Father        Social History     Socioeconomic History   • Marital status:    Tobacco Use   • Smoking status: Current Every Day Smoker     Packs/day: 1.00   Substance and Sexual Activity   • Alcohol use: Yes   • Drug use: Not Currently   • Sexual activity: Yes     Partners: Female           Objective   Physical Exam  Vitals and nursing note reviewed.   Constitutional:       General: He is not in acute distress.     Appearance: Normal appearance. He is normal weight. He is not diaphoretic.      Comments: Patient appears anxious, tremulous   HENT:      Head: Normocephalic and atraumatic.      Right Ear: Tympanic membrane normal.      Left Ear: Tympanic membrane normal.      Nose: Nose normal. No congestion.      Mouth/Throat:      Mouth: Mucous membranes are moist.      Pharynx: No oropharyngeal exudate.   Eyes:      General: Scleral icterus present.      Extraocular Movements: Extraocular movements intact.      Pupils: Pupils are equal, round, and reactive to light.      Comments: Mild scleral icterus   Cardiovascular:      Rate and Rhythm: Normal rate and regular rhythm.      Pulses: Normal pulses.      Heart sounds: Normal heart sounds. No murmur heard.  Pulmonary:      Effort: Pulmonary effort is normal.      Breath sounds: Normal breath sounds.   Abdominal:      General: Abdomen is flat.      Tenderness: There is no abdominal tenderness. There is no right CVA tenderness or left CVA tenderness.   Musculoskeletal:         General: Normal range of motion.      Cervical back: Normal range of motion. No rigidity.   Skin:      "General: Skin is warm.      Capillary Refill: Capillary refill takes less than 2 seconds.      Coloration: Skin is not jaundiced.      Findings: No bruising or erythema.   Neurological:      General: No focal deficit present.      Mental Status: He is alert and oriented to person, place, and time.      Cranial Nerves: No cranial nerve deficit.      Motor: No weakness.   Psychiatric:         Mood and Affect: Mood normal.         Behavior: Behavior normal.         ECG 12 Lead      Date/Time: 6/14/2022 9:12 PM  Performed by: Kadie Blanton PA  Authorized by: Ameya Pimentel MD   Interpreted by physician  Previous ECG: no previous ECG available  Rhythm: sinus tachycardia  Rate: tachycardic  BPM: 104  QRS axis: normal  Conduction: conduction normal  ST Segments: ST segments normal  T Waves: T waves normal  Other: no other findings  Clinical impression: normal ECG                 ED Course  ED Course as of 06/14/22 2118 Tue Jun 14, 2022   2100 Patient pulled out his IV, unsure how much fluids emptied out onto the floor versus patient received.  Patient given water to drink and tolerated this well.  Vital signs stable.  Recommended admission for withdrawal seizures monitoring, hypokalemia, hypomagnesemia, patient initially wanted to go home but ultimately agreed to be admitted for further care. [MC]   2103 Spoke with Dr. Palmer regarding admission [MC]      ED Course User Index  [MC] Kadie Blanton PA    /85 (BP Location: Right arm, Patient Position: Lying)   Pulse 106   Temp 98.4 °F (36.9 °C) (Oral)   Resp 18   Ht 167.6 cm (66\")   Wt 72.6 kg (160 lb)   SpO2 97%   BMI 25.82 kg/m²   Labs Reviewed   COMPREHENSIVE METABOLIC PANEL - Abnormal; Notable for the following components:       Result Value    Glucose 140 (*)     Potassium 3.1 (*)     Chloride 95 (*)     ALT (SGPT) 110 (*)     AST (SGOT) 248 (*)     Alkaline Phosphatase 301 (*)     Total Bilirubin 3.8 (*)     Anion Gap 19.0 (*)     All other components " within normal limits    Narrative:     GFR Normal >60  Chronic Kidney Disease <60  Kidney Failure <15     MAGNESIUM - Abnormal; Notable for the following components:    Magnesium 1.3 (*)     All other components within normal limits   CBC WITH AUTO DIFFERENTIAL - Abnormal; Notable for the following components:    RBC 3.44 (*)     Hemoglobin 12.2 (*)     Hematocrit 35.2 (*)     .4 (*)     MCH 35.4 (*)     RDW 16.2 (*)     RDW-SD 58.2 (*)     Lymphocyte % 13.0 (*)     Eosinophil % 0.1 (*)     All other components within normal limits   POCT GLUCOSE FINGERSTICK - Abnormal; Notable for the following components:    Glucose 131 (*)     All other components within normal limits   TROPONIN (IN-HOUSE) - Normal    Narrative:     Troponin T Reference Range:  <= 0.03 ng/mL-   Negative for AMI  >0.03 ng/mL-     Abnormal for myocardial necrosis.  Clinicians would have to utilize clinical acumen, EKG, Troponin and serial changes to determine if it is an Acute Myocardial Infarction or myocardial injury due to an underlying chronic condition.       Results may be falsely decreased if patient taking Biotin.     CK - Normal   ACETAMINOPHEN LEVEL - Normal    Narrative:     Acetaminophen Therapeutic Range  5-20 ug/mL      Hours after ingestion            Toxic Value    4 Hours                           150 ug/mL    8 Hours                            70 ug/mL   12 Hours                            40 ug/mL   16 Hours                            20 ug/mL    These values apply to a single ingestion only.    SALICYLATE LEVEL - Normal   RAINBOW DRAW    Narrative:     The following orders were created for panel order [place] Draw.  Procedure                               Abnormality         Status                     ---------                               -----------         ------                     Green Top (Gel)[[id number]]                                  Final result               Lavender Top[[id number]]                                      Final result               Gold Top - SST[[id number]]                                   Final result               Light Blue Top[[id number]]                                   Final result                 Please view results for these tests on the individual orders.   ETHANOL    Narrative:     Plasma Ethanol Clinical Symptoms:    ETOH (%)               Clinical Symptom  .01-.05              No apparent influence  .03-.12              Euphoria, Diminished judgment and attention   .09-.25              Impaired comprehension, Muscle incoordination  .18-.30              Confusion, Staggered gait, Slurred speech  .25-.40              Markedly decreased response to stimuli, unable to stand or                        walk, vomitting, sleep or stupor  .35-.50              Comatose, Anesthesia, Subnormal body temperature       RAINBOW DRAW    Narrative:     The following orders were created for panel order [place] Draw.  Procedure                               Abnormality         Status                     ---------                               -----------         ------                     Green Top (Gel)[[id number]]                                                             Lavender Top[[id number]]                                                                Gold Top - SST[[id number]]                                                              Light Blue Top[[id number]]                                                                Please view results for these tests on the individual orders.   URINE DRUG SCREEN   POCT GLUCOSE FINGERSTICK   GREEN TOP   LAVENDER TOP   GOLD TOP - SST   LIGHT BLUE TOP   CBC AND DIFFERENTIAL    Narrative:     The following orders were created for panel order CBC & Differential.  Procedure                               Abnormality         Status                     ---------                               -----------         ------                     CBC Auto Differential[[id number]]         Abnormal            Final result                 Please view results for these tests on the individual orders.   GREEN TOP   LAVENDER TOP   GOLD TOP - SST   LIGHT BLUE TOP     Medications   sodium chloride 0.9 % flush 10 mL (has no administration in time range)   LORazepam (ATIVAN) injection 0.5 mg (0.5 mg Intravenous Given 6/14/22 1819)   thiamine (B-1) 100 mg, folic acid 1 mg in sodium chloride 0.9 % 1,000 mL infusion (0 mL/hr Intravenous Stopped 6/14/22 2027)   iopamidol (ISOVUE-370) 76 % injection 100 mL (100 mL Intravenous Given 6/14/22 1849)   magnesium sulfate 2g/50 mL (PREMIX) infusion (0 g Intravenous Stopped 6/14/22 2027)     CT Head Without Contrast    Result Date: 6/14/2022   1. No acute intracranial hemorrhage or mass/mass effect. 2. Partially imaged moderate paranasal sinus disease.  Electronically Signed By-Moustapha Quezada MD On:6/14/2022 6:59 PM This report was finalized on [id number] by  Moustapha Quezada MD.    CT Abdomen Pelvis With Contrast    Result Date: 6/14/2022   1. Diffusely decreased hepatic attenuation, indicating hepatic steatosis. 2. Diffuse urinary bladder wall thickening could be accentuated by underdistention but could reflect a nonspecific cystitis. Recommend correlating with urinalysis. 3. Mild diffuse circumferential low attenuation colonic wall thickening could be accentuated by underdistention but could reflect sequela of chronic inflammatory bowel disease. 4. Colonic diverticula, without CT evidence of diverticulitis.  Electronically Signed By-Moustapha Quezada MD On:6/14/2022 7:31 PM This report was finalized on [id number] by  Moustapha Quezada MD.                                                 MDM  Number of Diagnoses or Management Options  Alcohol abuse  Alcohol withdrawal seizure without complication (HCC)  Hypokalemia  Hypomagnesemia  Tremor  Diagnosis management comments: MEDICAL DECISION  Epic Chart Review: No recent admissions.  Patient's last admission was April 2021 for  alcohol withdrawal.  Comorbidities: Alcohol abuse, substance abuse   Differentials: Alcohol withdrawal, seizure, electrolyte abnormality, dysrhythmia, brain tumor; this list is not all inclusive and does not constitute the entirety of considered causes  Radiology interpretation:  Images reviewed by me and interpreted by radiologist, as above  Lab interpretation:  Labs viewed by me significant for, as above  EKG interpretation: Reviewed by self interpreted by ER physician, sinus tachycardia with a rate of 104 with no acute ST changes.    While in the ED IV was placed and labs were obtained appropriate PPE was worn during exam and throughout all encounters with the patient.  Patient had the above evaluation.  Patient placed on continuous telemetry monitoring throughout ER stay.  Patient also placed on seizure precautions.  Patient offers no complaints of pain, shortness of breath discomfort, nausea or vomiting while in the ER.  He is awake alert and oriented x4 with no focal neurological deficits.  Patient given banana bag and 0.5 mg Ativan IV.  CMP glucose 140, chloride 95, , , alk phos 301, total bili 3.8, previously normal.  Potassium 3.1.  Magnesium low 1.3.  Normal troponin.  Normal ethanol.  Normal salicylate and acetaminophen.  Patient also given magnesium supplementation while in the ER.  CT head shows no acute intracranial hemorrhage or mass-effect.  CT abdomen pelvis shows diffusely decreased hepatic attenuation indicating hepatic steatosis, diffuse urinary bladder wall thickening that could be underdistention but also could reflect cystitis.  Mild diffuse circumferential low-attenuation colonic wall thickening, nonspecific.  Patient did not have any seizure activity while in the ER and remained alert and oriented x4 throughout entire ER stay.  Recommended admission for further evaluation, electrolyte replacement protocol and possible GI consultation for elevated LFTs bilirubin.  Patient is  agreeable.  I spoke with Dr. Palmer regarding admission who agreed to accept patient for care.  Patient stable on admission.         Amount and/or Complexity of Data Reviewed  Clinical lab tests: reviewed and ordered  Tests in the radiology section of CPT®: reviewed and ordered  Tests in the medicine section of CPT®: reviewed    Patient Progress  Patient progress: stable      Final diagnoses:   Alcohol withdrawal seizure without complication (HCC)   Tremor   Hypokalemia   Hypomagnesemia   Alcohol abuse       ED Disposition  ED Disposition     ED Disposition   Decision to Admit    Condition   --    Comment   Level of Care: Telemetry [5]   Admitting Physician: GERARDO GALEANA [[id number]]   Attending Physician: GERARDO GALEANA [[id number]]               No follow-up provider specified.       Medication List      No changes were made to your prescriptions during this visit.          Kadie Blanton PA  06/14/22 8178

## 2022-06-15 PROBLEM — F15.929 METHAMPHETAMINE INTOXICATION: Status: ACTIVE | Noted: 2022-06-15

## 2022-06-15 PROBLEM — F10.20 ALCOHOL USE DISORDER, MODERATE, DEPENDENCE (HCC): Status: ACTIVE | Noted: 2022-06-15

## 2022-06-15 LAB
AMPHET+METHAMPHET UR QL: POSITIVE
ANION GAP SERPL CALCULATED.3IONS-SCNC: 18 MMOL/L (ref 5–15)
BARBITURATES UR QL SCN: NEGATIVE
BASOPHILS # BLD AUTO: 0.1 10*3/MM3 (ref 0–0.2)
BASOPHILS NFR BLD AUTO: 1.2 % (ref 0–1.5)
BENZODIAZ UR QL SCN: NEGATIVE
BUN SERPL-MCNC: 12 MG/DL (ref 6–20)
BUN/CREAT SERPL: 20.3 (ref 7–25)
CALCIUM SPEC-SCNC: 8.4 MG/DL (ref 8.6–10.5)
CANNABINOIDS SERPL QL: NEGATIVE
CHLORIDE SERPL-SCNC: 92 MMOL/L (ref 98–107)
CO2 SERPL-SCNC: 24 MMOL/L (ref 22–29)
COCAINE UR QL: NEGATIVE
CREAT SERPL-MCNC: 0.59 MG/DL (ref 0.76–1.27)
DEPRECATED RDW RBC AUTO: 57.8 FL (ref 37–54)
EGFRCR SERPLBLD CKD-EPI 2021: 125 ML/MIN/1.73
EOSINOPHIL # BLD AUTO: 0 10*3/MM3 (ref 0–0.4)
EOSINOPHIL NFR BLD AUTO: 0.3 % (ref 0.3–6.2)
ERYTHROCYTE [DISTWIDTH] IN BLOOD BY AUTOMATED COUNT: 16.1 % (ref 12.3–15.4)
FOLATE SERPL-MCNC: 4.66 NG/ML (ref 4.78–24.2)
GLUCOSE SERPL-MCNC: 87 MG/DL (ref 65–99)
HCT VFR BLD AUTO: 31.4 % (ref 37.5–51)
HGB BLD-MCNC: 11.1 G/DL (ref 13–17.7)
LYMPHOCYTES # BLD AUTO: 1.2 10*3/MM3 (ref 0.7–3.1)
LYMPHOCYTES NFR BLD AUTO: 20.5 % (ref 19.6–45.3)
MAGNESIUM SERPL-MCNC: 1.5 MG/DL (ref 1.6–2.6)
MCH RBC QN AUTO: 36.1 PG (ref 26.6–33)
MCHC RBC AUTO-ENTMCNC: 35.4 G/DL (ref 31.5–35.7)
MCV RBC AUTO: 102.1 FL (ref 79–97)
METHADONE UR QL SCN: NEGATIVE
MONOCYTES # BLD AUTO: 0.9 10*3/MM3 (ref 0.1–0.9)
MONOCYTES NFR BLD AUTO: 15 % (ref 5–12)
NEUTROPHILS NFR BLD AUTO: 3.6 10*3/MM3 (ref 1.7–7)
NEUTROPHILS NFR BLD AUTO: 63 % (ref 42.7–76)
NRBC BLD AUTO-RTO: 0 /100 WBC (ref 0–0.2)
OPIATES UR QL: NEGATIVE
OXYCODONE UR QL SCN: NEGATIVE
PHOSPHATE SERPL-MCNC: 2.6 MG/DL (ref 2.5–4.5)
PLATELET # BLD AUTO: 195 10*3/MM3 (ref 140–450)
PMV BLD AUTO: 7.6 FL (ref 6–12)
POTASSIUM SERPL-SCNC: 3.2 MMOL/L (ref 3.5–5.2)
POTASSIUM SERPL-SCNC: 4.5 MMOL/L (ref 3.5–5.2)
RBC # BLD AUTO: 3.08 10*6/MM3 (ref 4.14–5.8)
SARS-COV-2 RNA RESP QL NAA+PROBE: NOT DETECTED
SODIUM SERPL-SCNC: 134 MMOL/L (ref 136–145)
VIT B12 BLD-MCNC: 894 PG/ML (ref 211–946)
WBC NRBC COR # BLD: 5.7 10*3/MM3 (ref 3.4–10.8)

## 2022-06-15 PROCEDURE — 84132 ASSAY OF SERUM POTASSIUM: CPT | Performed by: INTERNAL MEDICINE

## 2022-06-15 PROCEDURE — 84100 ASSAY OF PHOSPHORUS: CPT | Performed by: INTERNAL MEDICINE

## 2022-06-15 PROCEDURE — 80307 DRUG TEST PRSMV CHEM ANLYZR: CPT | Performed by: INTERNAL MEDICINE

## 2022-06-15 PROCEDURE — U0003 INFECTIOUS AGENT DETECTION BY NUCLEIC ACID (DNA OR RNA); SEVERE ACUTE RESPIRATORY SYNDROME CORONAVIRUS 2 (SARS-COV-2) (CORONAVIRUS DISEASE [COVID-19]), AMPLIFIED PROBE TECHNIQUE, MAKING USE OF HIGH THROUGHPUT TECHNOLOGIES AS DESCRIBED BY CMS-2020-01-R: HCPCS | Performed by: EMERGENCY MEDICINE

## 2022-06-15 PROCEDURE — 36415 COLL VENOUS BLD VENIPUNCTURE: CPT | Performed by: HOSPITALIST

## 2022-06-15 PROCEDURE — 80048 BASIC METABOLIC PNL TOTAL CA: CPT | Performed by: HOSPITALIST

## 2022-06-15 PROCEDURE — 25010000002 MAGNESIUM SULFATE 2 GM/50ML SOLUTION: Performed by: HOSPITALIST

## 2022-06-15 PROCEDURE — 99225 PR SBSQ OBSERVATION CARE/DAY 25 MINUTES: CPT | Performed by: INTERNAL MEDICINE

## 2022-06-15 PROCEDURE — G0378 HOSPITAL OBSERVATION PER HR: HCPCS

## 2022-06-15 PROCEDURE — 85025 COMPLETE CBC W/AUTO DIFF WBC: CPT | Performed by: HOSPITALIST

## 2022-06-15 PROCEDURE — 83735 ASSAY OF MAGNESIUM: CPT | Performed by: HOSPITALIST

## 2022-06-15 RX ORDER — OXYCODONE HYDROCHLORIDE 5 MG/1
5 TABLET ORAL EVERY 4 HOURS PRN
Status: DISCONTINUED | OUTPATIENT
Start: 2022-06-15 | End: 2022-06-16 | Stop reason: HOSPADM

## 2022-06-15 RX ADMIN — Medication 2 PACKET: at 21:19

## 2022-06-15 RX ADMIN — Medication 10 ML: at 08:23

## 2022-06-15 RX ADMIN — Medication 2 PACKET: at 18:20

## 2022-06-15 RX ADMIN — Medication 2 PACKET: at 07:48

## 2022-06-15 RX ADMIN — Medication 10 ML: at 21:19

## 2022-06-15 RX ADMIN — Medication 10 ML: at 01:46

## 2022-06-15 RX ADMIN — MAGNESIUM SULFATE HEPTAHYDRATE 2 G: 2 INJECTION, SOLUTION INTRAVENOUS at 02:53

## 2022-06-15 RX ADMIN — POTASSIUM CHLORIDE 40 MEQ: 1500 TABLET, EXTENDED RELEASE ORAL at 05:32

## 2022-06-15 RX ADMIN — Medication 2 PACKET: at 11:03

## 2022-06-15 RX ADMIN — POTASSIUM CHLORIDE 40 MEQ: 1500 TABLET, EXTENDED RELEASE ORAL at 02:53

## 2022-06-15 NOTE — CASE MANAGEMENT/SOCIAL WORK
Social Work Assessment  [facility]     Patient Name: Aleksandr Smith  MRN: [id number]  Today's Date: 6/15/2022    Admit Date: 6/14/2022     Substance Abuse     Row Name 06/15/22 1534       Substance Use    Substance Use Status current alcohol use;current street drug/inhalant/medication abuse    Substance Use Comment SW was consulted re: alcohol and drug abuse. SW met with pt in room 300 to discuss further. Pt lying in bed, no family present. SW introduced self and reason for consult. Pt affirmed his attempt to stop alcohol cold turkey, and began seizing, hence his admission. Pt was drinking a pint of vodka daily, having relapsed 2/2 work and home obligations becoming too overwhelming for him to manage. Pt also has a history of amphetamine, cocaine, and opioid abuse, but this was not discussed in detail, as pt interested in discussing treatment options. Pt stated he cannot do an inpatient program because he has to work. SW informed him of the IOP programs and the time commitments, and offered to make a referral. Pt declined, stating he would need to speak with his wife, who was reportedly at home looking for treatment centers for him. SW provided a list of local treatment options, as well as this writer’s contact info in the event he changes his mind during this hospitalization and decides to have a referral sent for IOP. Pt thanked this writer and denies further needs at this time.              Met with patient in room wearing PPE: mask.      Maintained distance greater than six feet and spent less than 15 minutes in the room.      CAILIN Carney, \[facility] [place] [facility]\""  Medical Social Worker  Ph [phone]  Fax [phone]  [email or web address]

## 2022-06-15 NOTE — PAYOR COMM NOTE
"INPATIENT ORDER 6/14/22    ER ADMIT WITH C/O SEIZURE ACTIVITY AFTER STOPPING ETOH USE 3 DAYS AGO. HX OF SEIZURE ACTITITY. TREMORS NOTED PER NURSING FLOWSHEET. IV MAGNESIUM AND POTASSIUM. PRN IV ATIVAN X 2 DOSES.   ====  AUTHORIZATION PENDING:   PLEASE FAX OR CALL DETERMINATION TO CONTACT BELOW:       THANK YOU,    KEIRY Alcaraz, RN  Utilization Review  [facility]  Phone: [phone]  Fax: [phone]      NPI: [id number]  Tax ID: [id number]        Steve Smith (41 y.o. Male)             Date of Birth   1980    Social Security Number       Address   [address] [place] [address]    Home Phone   [phone]    MRN   [id number]       [place]   Other    Marital Status                               Admission Date   6/14/22    Admission Type   Emergency    Admitting Provider   Augie Lee MD    Attending Provider   Augie Lee MD    Department, Room/Bed   [facility] 3A MEDICAL INPATIENT, 300/1       Discharge Date       Discharge Disposition       Discharge Destination                               Attending Provider: Augie Lee MD    Allergies: No Known Allergies    Isolation: None   Infection: None   Code Status: CPR   Advance Care Planning Activity    Ht: 182.9 cm (72\")   Wt: 76.1 kg (167 lb 11.2 oz)    Admission Cmt: None   Principal Problem: None                Active Insurance as of 6/14/2022     Primary Coverage     Payor Plan Insurance Group Employer/Plan Group    [id number]     Payor Plan Address Payor Plan Phone Number Payor Plan Fax Number Effective Dates    MAIL STOP:   4/1/2021 - None Entered    [address] [address]       [facility] [address]       Subscriber Name Subscriber Birth Date Member ID       STEVE SMITH 1980 [id number]                 Emergency Contacts      (Rel.) Home Phone Work Phone Mobile Phone    RL SMITH (Spouse) -- -- [phone]    "            History & Physical      Celi Lund MD at 22 [phone]              [facility]      Patient Name: Aleksandr Smith  : 1980  MRN: [id number]  Primary Care Physician:  Gibson Benoit Jr., MD  Date of admission: 2022      Subjective      Chief Complaint: Seizure activity at home    History of Present Illness: Aleksandr Smith is a 41 y.o. male with a history of alcohol abuse disorder and polysubstance abuse who presented to [facility] on 2022 after having witnessed seizure-like activity at home.    Patient was reportedly found by his wife having a seizure at home. Wife not present at bedside at time of admission. Pt mentioned prior hx of seizure activity when he tried to quit drinking alcohol about 2 years ago. He had been sober for about 1.5 years but had a relapse due to life psychosocial stressors. Pt now wants to quit again. He stated his last drink was about 3 days ago. He usually drinks about 1 pint of vodka daily. He mentioned being a functional alcoholic. 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 He mentioned feeling anxious and tremulous when he first arrived, but it had improved. He had mild headache, but no chest pain, SOB, n/v or abdominal pain.     While in the ED, patient received lorazepam 0.5mg IV, Mg sulfate 2g IV, and banana bag infusion.    Review of Systems   Constitutional: Negative for decreased appetite, diaphoresis and fever.   Eyes: Negative.    Cardiovascular: Negative for chest pain, dyspnea on exertion, leg swelling and palpitations.   Respiratory: Negative for cough and shortness of breath.    Endocrine: Negative.    Skin: Negative.    Musculoskeletal: Negative.    Gastrointestinal: Negative for abdominal pain, diarrhea, hematemesis, hematochezia, melena, nausea and vomiting.   Genitourinary:  Negative for dysuria.   Neurological: Positive for headaches and tremors. Negative for dizziness, focal weakness, paresthesias and weakness.   Psychiatric/Behavioral: Negative for altered mental status and hallucinations. The patient is not nervous/anxious.           Personal History     Past Medical History:   Diagnosis Date   • Alcohol abuse    • Substance abuse (HCC)        Past Surgical History:   Procedure Laterality Date   • FRACTURE SURGERY      - had left arm surgery after trauma from fall with subsequent fractures.    - had R thumb surgery after portion of thumb was cut off during accidental injury    Family History: Patient with hx of alcohol abuse disorder in his biological father and brother. He denied family hx of HTN and DM2.    Social History: Patient quit smoking about 3 months ago. He started smoking at the age of 22-23. He mentioned smoking 1/2-1ppd. He works as a supervisor for a construction company. He admit to heavy alcohol use. He drinks about 1 pint of vodka daily. He admits to recreational drug use with amphetamines, cocaine and opioids. He most recently snorted cocaine about 3-4 weeks ago.    Home Medications:  None    Allergies:  No Known Allergies    Objective      Vitals:   Temp:  [97.9 °F (36.6 °C)-98.6 °F (37 °C)] 97.9 °F (36.6 °C)  Heart Rate:  [] 91  Resp:  [18-22] 20  BP: (122-170)/() 142/92    Physical Exam   General:  Appears in no acute distress, non-toxic appearing  HEENT:  Normocephalic. Normal conjunctiva, EOM intact bilaterally, pupils equal and reactive to light. No mucosal pallor or cyanosis. No oral lesions. Tongue fasciculations  Respiratory: Respirations regular and unlabored at rest. Bilateral breath sounds with good air entry in all fields. No crackles, rubs or wheezes auscultated  Cardiovascular: S1S2 Regular rhythm, tachycardic. No murmur, rub or gallop. No pretibial pitting edema  Gastrointestinal: Abdomen soft, flat, non tender. Bowel sounds present. No  rebound or guarding.   Musculoskeletal: Moves all extremities voluntarily. No abnormal movements  Extremities: No digital clubbing or cyanosis. Hands mildly tremulous when arms outstretched.  Skin: Color pink. Skin warm and dry to touch. No rashes    Neuro: AAO x3, CN II-XII grossly intact, comprehension intact, follows commands appropriately  Psych: Mood and affect normal, pleasant and cooperative      Result Review    Result Review:  I have personally reviewed the results from the time of this admission to 6/15/2022 04:40 EDT and agree with these findings:  [x]  Laboratory  []  Microbiology  []  Radiology  []  EKG/Telemetry   []  Cardiology/Vascular   []  Pathology  []  Old records  []  Other:    LAB RESULTS:      Lab 06/15/22  0150 06/14/22  1733   WBC 5.70 7.50   HEMOGLOBIN 11.1* 12.2*   HEMATOCRIT 31.4* 35.2*   PLATELETS 195 234   NEUTROS ABS 3.60 5.70   LYMPHS ABS 1.20 1.00   MONOS ABS 0.90 0.80   EOS ABS 0.00 0.00   .1* 102.4*         Lab 06/15/22  0150 06/14/22  1733   SODIUM 134* 138   POTASSIUM 3.2* 3.1*   CHLORIDE 92* 95*   CO2 24.0 24.0   ANION GAP 18.0* 19.0*   BUN 12 11   CREATININE 0.59* 0.89   EGFR 125.0 110.4   GLUCOSE 87 140*   CALCIUM 8.4* 8.8   MAGNESIUM 1.5* 1.3*   PHOSPHORUS 2.6 1.9*         Lab 06/14/22  1733   TOTAL PROTEIN 7.1   ALBUMIN 3.80   GLOBULIN 3.3   ALT (SGPT) 110*   AST (SGOT) 248*   BILIRUBIN 3.8*   ALK PHOS 301*         Lab 06/14/22  1733   TROPONIN T <0.010         DATE OF EXAM:   6/14/2022 6:39 PM       PROCEDURE:   CT HEAD WO CONTRAST-          FINDINGS:   No acute intracranial hemorrhage or mass/mass effect. The ventricles and   sulci are stable and appropriate for age. The basilar cisterns are   patent. Normal gray-white matter differentiation is grossly maintained.   Moderate partial opacification of the ethmoid, sphenoid, and partially   imaged bilateral maxillary sinuses. Limited imaging of the orbits and   mastoid air cells is unremarkable. No acute osseous  abnormality is   identified.       Impression:         1. No acute intracranial hemorrhage or mass/mass effect.   2. Partially imaged moderate paranasal sinus disease.        DATE OF EXAM:   6/14/2022 6:39 PM       PROCEDURE:   CT ABDOMEN PELVIS W CONTRAST-       INDICATIONS:   elevated LFTs, elevated elijah       COMPARISON:   12/15/2014.       TECHNIQUE:   Routine transaxial slices were obtained through the abdomen and pelvis   after the intravenous administration of 100 mL of Isovue 370.   Reconstructed coronal and sagittal images were also obtained. Automated   exposure control and iterative construction methods were used.       FINDINGS:   Included lung bases are clear.       Diffusely decreased hepatic attenuation, indicating hepatic steatosis.   The gallbladder, spleen, pancreas, and adrenal glands are unremarkable.   Tiny subcentimeter cysts in both kidneys. Both kidneys are otherwise   unremarkable. The urinary bladder is nondistended. Diffuse urinary   bladder wall thickening could be accentuated by underdistention.       No significant colonic stool burden. Colonic diverticula, without CT   evidence of diverticulitis. Mild diffuse circumferential low attenuation   colonic wall thickening could be accentuated by underdistention but   could reflect sequela of chronic inflammatory bowel disease. No bowel   obstruction. The appendix is normal.       No free fluid in the abdomen or pelvis. No free intraperitoneal air. No   abdominal or pelvic lymphadenopathy is identified. No acute osseous   abnormality or concerning osseous lesion.       Impression:         1. Diffusely decreased hepatic attenuation, indicating hepatic   steatosis.   2. Diffuse urinary bladder wall thickening could be accentuated by   underdistention but could reflect a nonspecific cystitis. Recommend   correlating with urinalysis.   3. Mild diffuse circumferential low attenuation colonic wall thickening   could be accentuated by underdistention  but could reflect sequela of   chronic inflammatory bowel disease.   4. Colonic diverticula, without CT evidence of diverticulitis.            Assessment & Plan       Aleksandr Smith is a 41 y.o. male with a history of alcohol abuse disorder and polysubstance abuse who presented to [facility] on 6/14/2022 after having witnessed seizure-like activity at home admitted for alcohol withdrawal syndrome and electrolyte abnormalities.    Plan:   #Alcohol withdrawal syndrome  - CIWA protocol with PRN lorazepam  - seizure precautions    #Alcohol-induced hepatitis  Etiology for hyperbilirubinemia and elevated liver transaminases. Pt with undelry    #Electrolyte abnormalities  Noted hypokalemia, hypomagnesemia, hypophosphatemia  - check phosphorus  - replete electrolytes as needed; initiate electrolyte replacement protocolo    #Macrocytic anemia  Likely due to heavy alcohol use  - check Vit B12 and folate  - check iron, TIBC, and ferritin    #Iron overload  Suspect due to alcoholic liver disease  - recommend outpatient evaluation and f/u    #Polysubstance abuse  - SW to see pt in the AM for community resources for EtOH cessation programs    DVT prophylaxis:  Mechanical DVT prophylaxis orders are present.    CODE STATUS:    Code Status (Patient has no pulse and is not breathing): CPR (Attempt to Resuscitate)  Medical Interventions (Patient has pulse or is breathing): Full Support    Admission Status:  I believe this patient meets inpatient admission status.    I discussed the patient's findings and my recommendations with patient and nursing staff.      Signature: Electronically signed by Celi Lund MD, 06/15/22, 4:51 AM EDT.    Electronically signed by Celi Lnud MD at 06/15/22 4013          Emergency Department Notes      Mary Trujillo, RN at 06/14/22 1602        EMS reports patient is going through withdrawal, patient is an alcoholic.     Electronically signed by Mary Trujillo RN at 06/14/22  "1603     Kadie Maher RN at 06/14/22 1750        Pt arrives via EMS with c/o witnessed seizure at home. Pt states he is withdrawing from alcohol, he reports last drink was 2 days ago. Pt has tremors and vomited x 1. Side rails padded, call system in reach.    Electronically signed by Kadie Maher RN at 06/14/22 1751     Kadie Blanton PA at 06/14/22 1811      Procedure Orders    1. ECG 12 Lead [[id number]] ordered by Kadie Blanton PA               Subjective   Chief Complaint: Seizure, alcohol withdrawal    Patient is a 41-year-old  male with history of alcohol abuse presents to the ER with complaints of reported seizure today that he attributes to possible alcohol withdrawal.  Patient states he was laying on his couch today, states that his wife witnessed a seizure that lasted \"only a few seconds\" today.  Patient reports only one other seizure previously but states this was not alcohol related.  Patient states his last drink was about 3 days ago.  Patient states he drinks about 1 pint of liquor daily for the last year.  Patient has before this he would drink excessive amounts of beer.  Patient states he quit cold turkey 3 days ago.  He denies any chest pain shortness of breath headache or dizziness.  He denies any blurry vision lightheadedness slurred speech or numbness or tingling.  He denies any abdominal pain, nausea vomiting or diarrhea.  He does report some tremors intermittently.  Patient offers no other complaints at this time he reports that he feels fine since arriving in the ER.  Patient states he did not feel a seizure coming on.  No fever chills.  Patient states he is interested in rehab assistance for alcohol abuse.  He denies any suicidal homicidal ideations.    PCP: Gibson Benoit      History provided by:  Patient      Review of Systems   Constitutional: Negative for chills and fever.   HENT: Negative for congestion, sore throat and trouble swallowing.    Eyes: Negative.  "   Respiratory: Negative for shortness of breath and wheezing.    Cardiovascular: Negative for chest pain.   Gastrointestinal: Negative for abdominal pain, diarrhea, nausea and vomiting.   Endocrine: Negative.    Genitourinary: Negative for dysuria.   Musculoskeletal: Positive for myalgias.   Skin: Negative for rash.   Allergic/Immunologic: Negative.    Neurological: Positive for tremors, seizures and light-headedness. Negative for dizziness and headaches.   Psychiatric/Behavioral: Negative for behavioral problems.   All other systems reviewed and are negative.      Past Medical History:   Diagnosis Date   • Substance abuse (CMS/HCC)        No Known Allergies    Past Surgical History:   Procedure Laterality Date   • FRACTURE SURGERY         Family History   Problem Relation Age of Onset   • No Known Problems Mother    • No Known Problems Father        Social History     Socioeconomic History   • Marital status:    Tobacco Use   • Smoking status: Current Every Day Smoker     Packs/day: 1.00   Substance and Sexual Activity   • Alcohol use: Yes   • Drug use: Not Currently   • Sexual activity: Yes     Partners: Female           Objective   Physical Exam  Vitals and nursing note reviewed.   Constitutional:       General: He is not in acute distress.     Appearance: Normal appearance. He is normal weight. He is not diaphoretic.      Comments: Patient appears anxious, tremulous   HENT:      Head: Normocephalic and atraumatic.      Right Ear: Tympanic membrane normal.      Left Ear: Tympanic membrane normal.      Nose: Nose normal. No congestion.      Mouth/Throat:      Mouth: Mucous membranes are moist.      Pharynx: No oropharyngeal exudate.   Eyes:      General: Scleral icterus present.      Extraocular Movements: Extraocular movements intact.      Pupils: Pupils are equal, round, and reactive to light.      Comments: Mild scleral icterus   Cardiovascular:      Rate and Rhythm: Normal rate and regular rhythm.       Pulses: Normal pulses.      Heart sounds: Normal heart sounds. No murmur heard.  Pulmonary:      Effort: Pulmonary effort is normal.      Breath sounds: Normal breath sounds.   Abdominal:      General: Abdomen is flat.      Tenderness: There is no abdominal tenderness. There is no right CVA tenderness or left CVA tenderness.   Musculoskeletal:         General: Normal range of motion.      Cervical back: Normal range of motion. No rigidity.   Skin:     General: Skin is warm.      Capillary Refill: Capillary refill takes less than 2 seconds.      Coloration: Skin is not jaundiced.      Findings: No bruising or erythema.   Neurological:      General: No focal deficit present.      Mental Status: He is alert and oriented to person, place, and time.      Cranial Nerves: No cranial nerve deficit.      Motor: No weakness.   Psychiatric:         Mood and Affect: Mood normal.         Behavior: Behavior normal.         ECG 12 Lead      Date/Time: 6/14/2022 9:12 PM  Performed by: Kadie Blanton PA  Authorized by: Ameya Pimentel MD   Interpreted by physician  Previous ECG: no previous ECG available  Rhythm: sinus tachycardia  Rate: tachycardic  BPM: 104  QRS axis: normal  Conduction: conduction normal  ST Segments: ST segments normal  T Waves: T waves normal  Other: no other findings  Clinical impression: normal ECG                ED Course  ED Course as of 06/14/22 2118 Tue Jun 14, 2022 2100 Patient pulled out his IV, unsure how much fluids emptied out onto the floor versus patient received.  Patient given water to drink and tolerated this well.  Vital signs stable.  Recommended admission for withdrawal seizures monitoring, hypokalemia, hypomagnesemia, patient initially wanted to go home but ultimately agreed to be admitted for further care. []   2103 Spoke with Dr. Palmer regarding admission []      ED Course User Index  [MC] Kadie Blanton PA    /85 (BP Location: Right arm, Patient Position: Lying)   Pulse  "106   Temp 98.4 °F (36.9 °C) (Oral)   Resp 18   Ht 167.6 cm (66\")   Wt 72.6 kg (160 lb)   SpO2 97%   BMI 25.82 kg/m²   Labs Reviewed   COMPREHENSIVE METABOLIC PANEL - Abnormal; Notable for the following components:       Result Value    Glucose 140 (*)     Potassium 3.1 (*)     Chloride 95 (*)     ALT (SGPT) 110 (*)     AST (SGOT) 248 (*)     Alkaline Phosphatase 301 (*)     Total Bilirubin 3.8 (*)     Anion Gap 19.0 (*)     All other components within normal limits    Narrative:     GFR Normal >60  Chronic Kidney Disease <60  Kidney Failure <15     MAGNESIUM - Abnormal; Notable for the following components:    Magnesium 1.3 (*)     All other components within normal limits   CBC WITH AUTO DIFFERENTIAL - Abnormal; Notable for the following components:    RBC 3.44 (*)     Hemoglobin 12.2 (*)     Hematocrit 35.2 (*)     .4 (*)     MCH 35.4 (*)     RDW 16.2 (*)     RDW-SD 58.2 (*)     Lymphocyte % 13.0 (*)     Eosinophil % 0.1 (*)     All other components within normal limits   POCT GLUCOSE FINGERSTICK - Abnormal; Notable for the following components:    Glucose 131 (*)     All other components within normal limits   TROPONIN (IN-HOUSE) - Normal    Narrative:     Troponin T Reference Range:  <= 0.03 ng/mL-   Negative for AMI  >0.03 ng/mL-     Abnormal for myocardial necrosis.  Clinicians would have to utilize clinical acumen, EKG, Troponin and serial changes to determine if it is an Acute Myocardial Infarction or myocardial injury due to an underlying chronic condition.       Results may be falsely decreased if patient taking Biotin.     CK - Normal   ACETAMINOPHEN LEVEL - Normal    Narrative:     Acetaminophen Therapeutic Range  5-20 ug/mL      Hours after ingestion            Toxic Value    4 Hours                           150 ug/mL    8 Hours                            70 ug/mL   12 Hours                            40 ug/mL   16 Hours                            20 ug/mL    These values apply to a " single ingestion only.    SALICYLATE LEVEL - Normal   RAINBOW DRAW    Narrative:     The following orders were created for panel order [place] Draw.  Procedure                               Abnormality         Status                     ---------                               -----------         ------                     Green Top (Gel)[[id number]]                                  Final result               Lavender Top[[id number]]                                     Final result               Gold Top - SST[[id number]]                                   Final result               Light Blue Top[[id number]]                                   Final result                 Please view results for these tests on the individual orders.   ETHANOL    Narrative:     Plasma Ethanol Clinical Symptoms:    ETOH (%)               Clinical Symptom  .01-.05              No apparent influence  .03-.12              Euphoria, Diminished judgment and attention   .09-.25              Impaired comprehension, Muscle incoordination  .18-.30              Confusion, Staggered gait, Slurred speech  .25-.40              Markedly decreased response to stimuli, unable to stand or                        walk, vomitting, sleep or stupor  .35-.50              Comatose, Anesthesia, Subnormal body temperature       RAINBOW DRAW    Narrative:     The following orders were created for panel order [place] Draw.  Procedure                               Abnormality         Status                     ---------                               -----------         ------                     Green Top (Gel)[[id number]]                                                             Lavender Top[[id number]]                                                                Gold Top - SST[[id number]]                                                              Light Blue Top[[id number]]                                                                Please view results for these  tests on the individual orders.   URINE DRUG SCREEN   POCT GLUCOSE FINGERSTICK   GREEN TOP   LAVENDER TOP   GOLD TOP - SST   LIGHT BLUE TOP   CBC AND DIFFERENTIAL    Narrative:     The following orders were created for panel order CBC & Differential.  Procedure                               Abnormality         Status                     ---------                               -----------         ------                     CBC Auto Differential[[id number]]        Abnormal            Final result                 Please view results for these tests on the individual orders.   GREEN TOP   LAVENDER TOP   GOLD TOP - SST   LIGHT BLUE TOP     Medications   sodium chloride 0.9 % flush 10 mL (has no administration in time range)   LORazepam (ATIVAN) injection 0.5 mg (0.5 mg Intravenous Given 6/14/22 1819)   thiamine (B-1) 100 mg, folic acid 1 mg in sodium chloride 0.9 % 1,000 mL infusion (0 mL/hr Intravenous Stopped 6/14/22 2027)   iopamidol (ISOVUE-370) 76 % injection 100 mL (100 mL Intravenous Given 6/14/22 1849)   magnesium sulfate 2g/50 mL (PREMIX) infusion (0 g Intravenous Stopped 6/14/22 2027)     CT Head Without Contrast    Result Date: 6/14/2022   1. No acute intracranial hemorrhage or mass/mass effect. 2. Partially imaged moderate paranasal sinus disease.  Electronically Signed By-Moustapha Quezada MD On:6/14/2022 6:59 PM This report was finalized on [id number] by  Moustapha Quezada MD.    CT Abdomen Pelvis With Contrast    Result Date: 6/14/2022   1. Diffusely decreased hepatic attenuation, indicating hepatic steatosis. 2. Diffuse urinary bladder wall thickening could be accentuated by underdistention but could reflect a nonspecific cystitis. Recommend correlating with urinalysis. 3. Mild diffuse circumferential low attenuation colonic wall thickening could be accentuated by underdistention but could reflect sequela of chronic inflammatory bowel disease. 4. Colonic diverticula, without CT evidence of diverticulitis.   Electronically Signed By-Moustapha Quezada MD On:6/14/2022 7:31 PM This report was finalized on [id number] by  Moustapha Quezada MD.                                                 MDM  Number of Diagnoses or Management Options  Alcohol abuse  Alcohol withdrawal seizure without complication (HCC)  Hypokalemia  Hypomagnesemia  Tremor  Diagnosis management comments: MEDICAL DECISION  Epic Chart Review: No recent admissions.  Patient's last admission was April 2021 for alcohol withdrawal.  Comorbidities: Alcohol abuse, substance abuse   Differentials: Alcohol withdrawal, seizure, electrolyte abnormality, dysrhythmia, brain tumor; this list is not all inclusive and does not constitute the entirety of considered causes  Radiology interpretation:  Images reviewed by me and interpreted by radiologist, as above  Lab interpretation:  Labs viewed by me significant for, as above  EKG interpretation: Reviewed by self interpreted by ER physician, sinus tachycardia with a rate of 104 with no acute ST changes.    While in the ED IV was placed and labs were obtained appropriate PPE was worn during exam and throughout all encounters with the patient.  Patient had the above evaluation.  Patient placed on continuous telemetry monitoring throughout ER stay.  Patient also placed on seizure precautions.  Patient offers no complaints of pain, shortness of breath discomfort, nausea or vomiting while in the ER.  He is awake alert and oriented x4 with no focal neurological deficits.  Patient given banana bag and 0.5 mg Ativan IV.  CMP glucose 140, chloride 95, , , alk phos 301, total bili 3.8, previously normal.  Potassium 3.1.  Magnesium low 1.3.  Normal troponin.  Normal ethanol.  Normal salicylate and acetaminophen.  Patient also given magnesium supplementation while in the ER.  CT head shows no acute intracranial hemorrhage or mass-effect.  CT abdomen pelvis shows diffusely decreased hepatic attenuation indicating hepatic  "steatosis, diffuse urinary bladder wall thickening that could be underdistention but also could reflect cystitis.  Mild diffuse circumferential low-attenuation colonic wall thickening, nonspecific.  Patient did not have any seizure activity while in the ER and remained alert and oriented x4 throughout entire ER stay.  Recommended admission for further evaluation, electrolyte replacement protocol and possible GI consultation for elevated LFTs bilirubin.  Patient is agreeable.  I spoke with Dr. Palmer regarding admission who agreed to accept patient for care.  Patient stable on admission.         Amount and/or Complexity of Data Reviewed  Clinical lab tests: reviewed and ordered  Tests in the radiology section of CPT®: reviewed and ordered  Tests in the medicine section of CPT®: reviewed    Patient Progress  Patient progress: stable      Final diagnoses:   Alcohol withdrawal seizure without complication (HCC)   Tremor   Hypokalemia   Hypomagnesemia   Alcohol abuse       ED Disposition  ED Disposition     ED Disposition   Decision to Admit    Condition   --    Comment   Level of Care: Telemetry [5]   Admitting Physician: GERARDO GALEANA [[id number]]   Attending Physician: GERARDO GALEANA [[id number]]               No follow-up provider specified.       Medication List      No changes were made to your prescriptions during this visit.          Kadie Blanton PA  06/14/22 2118      Electronically signed by Kadie Blanton PA at 06/14/22 2118     Natalie Levin at 06/14/22 1814        [facility] transport requested to [facility]    Electronically signed by Natalie Levin at 06/14/22 1814     Natalie Levin at 06/14/22 1817        EKG requested       Electronically signed by Natalie Levin at 06/14/22 1817     Kadie Maher RN at 06/14/22 2027        Pt was found wandering in the hallway. States I'm looking for an ice machine\" Pt removed all monitoring equipment and dressed himself.    Electronically signed by Gunnar, " Kadie DONOHUE RN at 06/14/22 2028       Vital Signs (last day)     Date/Time Temp Temp src Pulse Resp BP Patient Position SpO2    06/15/22 0721 98.4 (36.9) Oral 80 18 129/87 Lying 98    06/15/22 0348 97.9 (36.6) Oral 91 20 142/92 Lying 100    06/15/22 0117 98.6 (37) Oral 93 20 128/86 Lying 96    06/14/22 19:33:12 -- -- 106 18 122/85 Lying 97    06/14/22 17:49:30 98.4 (36.9) Oral -- -- -- -- --    06/14/22 17:31:15 -- -- 102 18 131/93 Lying 97    06/14/22 1542 -- -- 121 22 170/103 -- 98            Facility-Administered Medications as of 6/15/2022   Medication Dose Route Frequency Provider Last Rate Last Admin   • acetaminophen (TYLENOL) tablet 650 mg  650 mg Oral Q4H PRN Celi Lund MD        Or   • acetaminophen (TYLENOL) 160 MG/5ML solution 650 mg  650 mg Oral Q4H PRN Celi Lund MD        Or   • acetaminophen (TYLENOL) suppository 650 mg  650 mg Rectal Q4H PRN Celi Lund MD       • aluminum-magnesium hydroxide-simethicone (MAALOX MAX) 400-400-40 MG/5ML suspension 15 mL  15 mL Oral Q6H PRN Celi Lund MD       • [COMPLETED] iopamidol (ISOVUE-370) 76 % injection 100 mL  100 mL Intravenous Once in imaging Ameya Pimentel MD   100 mL at 06/14/22 1849   • [COMPLETED] LORazepam (ATIVAN) injection 0.5 mg  0.5 mg Intravenous Once Kadie Blanton PA   0.5 mg at 06/14/22 1819   • LORazepam (ATIVAN) tablet 1 mg  1 mg Oral Q2H PRN Celi Lund MD        Or   • LORazepam (ATIVAN) injection 1 mg  1 mg Intravenous Q2H PRN Celi Lund MD   1 mg at 06/14/22 2342    Or   • LORazepam (ATIVAN) tablet 2 mg  2 mg Oral Q1H PRCeli Yoder MD        Or   • LORazepam (ATIVAN) injection 2 mg  2 mg Intravenous Q1H PRN Celi Lund MD        Or   • LORazepam (ATIVAN) injection 2 mg  2 mg Intravenous Q15 Min PRCeli Yoder MD        Or   • LORazepam (ATIVAN) injection 2 mg  2 mg Intramuscular Q15 Min PRCeli Yoder MD       • Magnesium Sulfate 2 gram infusion - Mg less than or equal to 1.5 mg/dL  2  g Intravenous PRN Celi Lund MD 25 mL/hr at 06/15/22 0253 2 g at 06/15/22 0253    Or   • Magnesium Sulfate 1 gram infusion - Mg 1.6-1.9 mg/dL  1 g Intravenous PRN Celi Lund MD       • [COMPLETED] magnesium sulfate 2g/50 mL (PREMIX) infusion  2 g Intravenous Once Kadie Blanton PA   Stopped at 22   • melatonin tablet 5 mg  5 mg Oral Nightly PRN Celi Lund MD       • ondansetron (ZOFRAN) tablet 4 mg  4 mg Oral Q6H PRN Celi Lund MD        Or   • ondansetron (ZOFRAN) injection 4 mg  4 mg Intravenous Q6H PRN Celi Lund MD       • potassium & sodium phosphates (PHOS-NAK) 280-160-250 MG packet - for Phosphorus less than 1.25 mg/dL  2 packet Oral Q6H PRN Celi Lund MD        Or   • potassium & sodium phosphates (PHOS-NAK) 280-160-250 MG packet - for Phosphorus 1.25 - 2.5 mg/dL  2 packet Oral Q6H PRN Celi Lund MD       • potassium & sodium phosphates (PHOS-NAK) oral packet  2 packet Oral 4x Daily With Meals & Nightly Celi Lund MD   2 packet at 06/15/22 0748   • [] potassium chloride (K-DUR,KLOR-CON) CR tablet 40 mEq  40 mEq Oral Q4H Celi Lund MD       • potassium chloride (K-DUR,KLOR-CON) CR tablet 40 mEq  40 mEq Oral PRN Celi Lund MD   40 mEq at 06/15/22 0532   • potassium chloride (KLOR-CON) packet 40 mEq  40 mEq Oral PRN Celi Lund MD       • sodium chloride 0.9 % flush 10 mL  10 mL Intravenous PRN Kadie Blanton PA       • sodium chloride 0.9 % flush 10 mL  10 mL Intravenous Q12H Celi Lund MD   10 mL at 06/15/22 0823   • sodium chloride 0.9 % flush 10 mL  10 mL Intravenous PRN Celi Lund MD       • [COMPLETED] thiamine (B-1) 100 mg, folic acid 1 mg in sodium chloride 0.9 % 1,000 mL infusion  1,000 mL/hr Intravenous Once Kadie Blanton PA   Stopped at 22       Lab Results (last 24 hours)     Procedure Component Value Units Date/Time    Potassium [[id number]] Collected: 06/15/22 0942    Specimen: Blood  Updated: 06/15/22 1007    COVID PRE-OP / PRE-PROCEDURE SCREENING ORDER (NO ISOLATION) - Swab, Nasopharynx [[id number]]  (Normal) Collected: 06/15/22 0221    Specimen: Swab from Nasopharynx Updated: 06/15/22 0509    Narrative:      The following orders were created for panel order COVID PRE-OP / PRE-PROCEDURE SCREENING ORDER (NO ISOLATION) - Swab, Nasopharynx.  Procedure                               Abnormality         Status                     ---------                               -----------         ------                     COVID-19,CEPHEID/REBECA,CO...[[id number]]  Normal              Final result                 Please view results for these tests on the individual orders.    COVID-19,CEPHEID/REBECA,COR/ASHLEY/PAD/JENNY IN-HOUSE(OR EMERGENT/ADD-ON),NP SWAB IN TRANSPORT MEDIA 3-4 HR TAT, RT-PCR - Swab, Nasopharynx [[id number]]  (Normal) Collected: 06/15/22 0221    Specimen: Swab from Nasopharynx Updated: 06/15/22 0509     COVID19 Not Detected    Narrative:      Fact sheet for providers: https://www.fda.gov/media/463942/download     Fact sheet for patients: https://www.fda.gov/media/476514/download  Fact sheet for providers: https://www.fda.gov/media/797336/download     Fact sheet for patients: https://www.fda.gov/media/310272/download    Phosphorus [[id number]]  (Normal) Collected: 06/15/22 0150    Specimen: Blood Updated: 06/15/22 0249     Phosphorus 2.6 mg/dL     Basic Metabolic Panel [[id number]]  (Abnormal) Collected: 06/15/22 0150    Specimen: Blood Updated: 06/15/22 0245     Glucose 87 mg/dL      BUN 12 mg/dL      Creatinine 0.59 mg/dL      Sodium 134 mmol/L      Potassium 3.2 mmol/L      Chloride 92 mmol/L      CO2 24.0 mmol/L      Calcium 8.4 mg/dL      BUN/Creatinine Ratio 20.3     Anion Gap 18.0 mmol/L      eGFR 125.0 mL/min/1.73      Comment: National Kidney Foundation and American Society of Nephrology (ASN) Task Force recommended calculation based on the Chronic Kidney Disease Epidemiology Collaboration  (CKD-EPI) equation refit without adjustment for race.       Narrative:      GFR Normal >60  Chronic Kidney Disease <60  Kidney Failure <15      Magnesium [[id number]]  (Abnormal) Collected: 06/15/22 0150    Specimen: Blood Updated: 06/15/22 0245     Magnesium 1.5 mg/dL     CBC Auto Differential [[id number]]  (Abnormal) Collected: 06/15/22 0150    Specimen: Blood Updated: 06/15/22 0220     WBC 5.70 10*3/mm3      RBC 3.08 10*6/mm3      Hemoglobin 11.1 g/dL      Hematocrit 31.4 %      .1 fL      MCH 36.1 pg      MCHC 35.4 g/dL      RDW 16.1 %      RDW-SD 57.8 fl      MPV 7.6 fL      Platelets 195 10*3/mm3      Neutrophil % 63.0 %      Lymphocyte % 20.5 %      Monocyte % 15.0 %      Eosinophil % 0.3 %      Basophil % 1.2 %      Neutrophils, Absolute 3.60 10*3/mm3      Lymphocytes, Absolute 1.20 10*3/mm3      Monocytes, Absolute 0.90 10*3/mm3      Eosinophils, Absolute 0.00 10*3/mm3      Basophils, Absolute 0.10 10*3/mm3      nRBC 0.0 /100 WBC     Ferritin [[id number]]  (Abnormal) Collected: 06/14/22 1733    Specimen: Blood Updated: 06/14/22 2226     Ferritin 3,062.00 ng/mL     Narrative:      Results may be falsely decreased if patient taking Biotin.      Phosphorus [[id number]]  (Abnormal) Collected: 06/14/22 1733    Specimen: Blood Updated: 06/14/22 2200     Phosphorus 1.9 mg/dL     Iron Profile [[id number]]  (Abnormal) Collected: 06/14/22 1733    Specimen: Blood Updated: 06/14/22 2200     Iron 164 mcg/dL      Iron Saturation 74 %      Transferrin 148 mg/dL      TIBC 221 mcg/dL     Vitamin B12 [[id number]] Collected: 06/14/22 1733    Specimen: Blood Updated: 06/14/22 2143    Folate [[id number]] Collected: 06/14/22 1733    Specimen: Blood Updated: 06/14/22 2143    [place] Draw [[id number]] Collected: 06/14/22 1733    Specimen: Blood Updated: 06/14/22 1847    Narrative:      The following orders were created for panel order [place] Draw.  Procedure                               Abnormality         Status                      ---------                               -----------         ------                     Green Top (Gel)[[id number]]                                  Final result               Lavender Top[[id number]]                                     Final result               Gold Top - SST[[id number]]                                   Final result               Light Blue Top[[id number]]                                   Final result                 Please view results for these tests on the individual orders.    Light Blue Top [[id number]] Collected: 06/14/22 1733    Specimen: Blood Updated: 06/14/22 1847     Extra Tube Hold for add-ons.     Comment: Auto resulted       Green Top (Gel) [[id number]] Collected: 06/14/22 1733    Specimen: Blood Updated: 06/14/22 1847     Extra Tube Hold for add-ons.     Comment: Auto resulted.       Gold Top - SST [[id number]] Collected: 06/14/22 1733    Specimen: Blood Updated: 06/14/22 1847     Extra Tube Hold for add-ons.     Comment: Auto resulted.       Lavender Top [[id number]] Collected: 06/14/22 1733    Specimen: Blood Updated: 06/14/22 1840    CBC & Differential [[id number]]  (Abnormal) Collected: 06/14/22 1733    Specimen: Blood Updated: 06/14/22 1832    Narrative:      The following orders were created for panel order CBC & Differential.  Procedure                               Abnormality         Status                     ---------                               -----------         ------                     CBC Auto Differential[[id number]]        Abnormal            Final result                 Please view results for these tests on the individual orders.    CBC Auto Differential [[id number]]  (Abnormal) Collected: 06/14/22 1733    Specimen: Blood Updated: 06/14/22 1832     WBC 7.50 10*3/mm3      RBC 3.44 10*6/mm3      Hemoglobin 12.2 g/dL      Hematocrit 35.2 %      .4 fL      MCH 35.4 pg      MCHC 34.6 g/dL      RDW 16.2 %      RDW-SD 58.2 fl      MPV 7.9 fL      Platelets 234  10*3/mm3      Neutrophil % 75.3 %      Lymphocyte % 13.0 %      Monocyte % 10.4 %      Eosinophil % 0.1 %      Basophil % 1.2 %      Neutrophils, Absolute 5.70 10*3/mm3      Lymphocytes, Absolute 1.00 10*3/mm3      Monocytes, Absolute 0.80 10*3/mm3      Eosinophils, Absolute 0.00 10*3/mm3      Basophils, Absolute 0.10 10*3/mm3      nRBC 0.1 /100 WBC     Comprehensive Metabolic Panel [[id number]]  (Abnormal) Collected: 06/14/22 1733    Specimen: Blood Updated: 06/14/22 1830     Glucose 140 mg/dL      BUN 11 mg/dL      Creatinine 0.89 mg/dL      Sodium 138 mmol/L      Potassium 3.1 mmol/L      Chloride 95 mmol/L      CO2 24.0 mmol/L      Calcium 8.8 mg/dL      Total Protein 7.1 g/dL      Albumin 3.80 g/dL      ALT (SGPT) 110 U/L      AST (SGOT) 248 U/L      Alkaline Phosphatase 301 U/L      Total Bilirubin 3.8 mg/dL      Globulin 3.3 gm/dL      A/G Ratio 1.2 g/dL      BUN/Creatinine Ratio 12.4     Anion Gap 19.0 mmol/L      eGFR 110.4 mL/min/1.73      Comment: National Kidney Foundation and American Society of Nephrology (ASN) Task Force recommended calculation based on the Chronic Kidney Disease Epidemiology Collaboration (CKD-EPI) equation refit without adjustment for race.       Narrative:      GFR Normal >60  Chronic Kidney Disease <60  Kidney Failure <15      Ethanol [[id number]] Collected: 06/14/22 1733    Specimen: Blood Updated: 06/14/22 1830     Ethanol % <0.010 %     Narrative:      Plasma Ethanol Clinical Symptoms:    ETOH (%)               Clinical Symptom  .01-.05              No apparent influence  .03-.12              Euphoria, Diminished judgment and attention   .09-.25              Impaired comprehension, Muscle incoordination  .18-.30              Confusion, Staggered gait, Slurred speech  .25-.40              Markedly decreased response to stimuli, unable to stand or                        walk, vomitting, sleep or stupor  .35-.50              Comatose, Anesthesia, Subnormal body temperature        CK  [[id number]]  (Normal) Collected: 06/14/22 1733    Specimen: Blood Updated: 06/14/22 1830     Creatine Kinase 163 U/L     Troponin [[id number]]  (Normal) Collected: 06/14/22 1733    Specimen: Blood Updated: 06/14/22 1830     Troponin T <0.010 ng/mL     Narrative:      Troponin T Reference Range:  <= 0.03 ng/mL-   Negative for AMI  >0.03 ng/mL-     Abnormal for myocardial necrosis.  Clinicians would have to utilize clinical acumen, EKG, Troponin and serial changes to determine if it is an Acute Myocardial Infarction or myocardial injury due to an underlying chronic condition.       Results may be falsely decreased if patient taking Biotin.      Magnesium [[id number]]  (Abnormal) Collected: 06/14/22 1733    Specimen: Blood Updated: 06/14/22 1830     Magnesium 1.3 mg/dL     Acetaminophen Level [[id number]]  (Normal) Collected: 06/14/22 1733    Specimen: Blood Updated: 06/14/22 1830     Acetaminophen <5.0 mcg/mL     Narrative:      Acetaminophen Therapeutic Range  5-20 ug/mL      Hours after ingestion            Toxic Value    4 Hours                           150 ug/mL    8 Hours                            70 ug/mL   12 Hours                            40 ug/mL   16 Hours                            20 ug/mL    These values apply to a single ingestion only.     Salicylate Level [[id number]]  (Normal) Collected: 06/14/22 1733    Specimen: Blood Updated: 06/14/22 1830     Salicylate 0.4 mg/dL     POC Glucose Once [[id number]]  (Abnormal) Collected: 06/14/22 1824    Specimen: Blood Updated: 06/14/22 1826     Glucose 131 mg/dL      Comment: Serial Number: [id number]:  [id number]

## 2022-06-15 NOTE — PROGRESS NOTES
[facility] Medicine Services Daily Progress Note    Patient Name: Aleksandr Smith  : 1980  MRN: [id number]  Primary Care Physician:  Gibson Benoit Jr., MD  Date of admission: 2022      Subjective      Chief Complaint: Seizure-like episode.      Patient Reports:        6/15/2022.  Patient was seen and examined.  Patient reported slight improvement in his symptoms.  No overnight events noted.      Review of Systems   Constitutional: Negative.   HENT: Negative.    Eyes: Negative.    Cardiovascular: Negative.    Respiratory: Negative.    Endocrine: Negative.    Hematologic/Lymphatic: Negative.    Skin: Negative.    Musculoskeletal: Negative.    Gastrointestinal: Negative.    Genitourinary: Negative.    Neurological: Negative.    Psychiatric/Behavioral: Negative.    Allergic/Immunologic: Negative.             Objective      Vitals:   Temp:  [97.9 °F (36.6 °C)-98.6 °F (37 °C)] 98.4 °F (36.9 °C)  Heart Rate:  [] 80  Resp:  [18-20] 18  BP: (122-142)/(85-93) 129/87    Physical Exam  Vitals and nursing note reviewed.   Constitutional:       General: He is not in acute distress.  HENT:      Head: Normocephalic.      Nose: Nose normal.      Mouth/Throat:      Mouth: Mucous membranes are dry.      Pharynx: Oropharynx is clear.   Eyes:      Extraocular Movements: Extraocular movements intact.      Conjunctiva/sclera: Conjunctivae normal.      Pupils: Pupils are equal, round, and reactive to light.   Cardiovascular:      Pulses: Normal pulses.      Heart sounds: No murmur heard.    No friction rub. No gallop.      Comments: S1 and S2 present.  No tachycardia.  Pulmonary:      Breath sounds: No stridor. No wheezing or rales.   Chest:      Chest wall: No tenderness.   Abdominal:      General: Bowel sounds are normal. There is no distension.      Palpations: Abdomen is soft.      Tenderness: There is no abdominal tenderness. There is no right CVA tenderness or guarding.    Musculoskeletal:         General: No swelling, tenderness, deformity or signs of injury.      Cervical back: Normal range of motion. No rigidity.      Right lower leg: No edema.      Left lower leg: No edema.   Skin:     General: Skin is warm and dry.      Capillary Refill: Capillary refill takes less than 2 seconds.      Coloration: Skin is not jaundiced.      Findings: No bruising, erythema, lesion or rash.   Neurological:      Mental Status: He is alert.      Comments: No facial asymmetry noted.  Gait and station not tested.   Psychiatric:      Comments: No agitation.               Result Review    Result Review:  I have personally reviewed the results from the time of this admission to 6/15/2022 16:46 EDT and agree with these findings:  [x]  Laboratory  [x]  Microbiology  [x]  Radiology  []  EKG/Telemetry   []  Cardiology/Vascular   []  Pathology  []  Old records  []  Other:  Most notable findings include:           Assessment & Plan    From previous notes and with minor updates.  Brief Patient Summary:     Patient is a 41 y.o. male with a history of polysubstance abuse and alcohol use disorder who presented to [facility] on 6/14/2022 after having witnessed seizure-like activity at home.  Patient was reportedly found by his wife having a seizure at home. Wife not present at bedside at time of admission. Pt mentioned prior hx of seizure activity when he tried to quit drinking alcohol about 2 years ago. He had been sober for about 1.5 years but had a relapse due to life psychosocial stressors. Pt now wants to quit again. He stated his last drink was about 3 days ago. He usually drinks about 1 pint of vodka daily. He mentioned being a functional alcoholic. 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 He mentioned feeling anxious and tremulous when he first arrived, but it had improved. He had mild  headache, but no chest pain, SOB, n/v or abdominal pain.   Patient was seen in the emergency room and while in the ED, patient received lorazepam 0.5mg IV, Mg sulfate 2g IV, and banana bag infusion.    potassium & sodium phosphates, 2 packet, Oral, 4x Daily With Meals & Nightly  sodium chloride, 10 mL, Intravenous, Q12H             Active Hospital Problems:  Active Hospital Problems    Diagnosis    • Alcohol use disorder, moderate, dependence (HCC)  Complete alcohol cessation counseling.        • Alcohol withdrawal seizure without complication (HCC)  Treat with CIWA protocol.        •   Polysubstance abuse.  Complaints polysubstance cessation counseling.            Resume appropriate patient's home medications for other chronic medical conditions.  Continue the present level of care.  Patient and family agreed with the plan of care.      DVT prophylaxis:  Mechanical DVT prophylaxis orders are present.    CODE STATUS:    Code Status (Patient has no pulse and is not breathing): CPR (Attempt to Resuscitate)  Medical Interventions (Patient has pulse or is breathing): Full Support      Disposition: Pending patient's clinical improvement.    This patient has been examined wearing appropriate Personal Protective Equipment and discussed with hospital infection control department, [facility], infectious disease specialist and pulmonologist. 06/15/22      Electronically signed by Augie Lee MD, FACP,  06/15/22, 16:46 EDT.      [facility] Hospitalist Team

## 2022-06-15 NOTE — CASE MANAGEMENT/SOCIAL WORK
Discharge Planning Assessment  [facility]     Patient Name: Aleksandr Smith  MRN: [id number]  Today's Date: 6/15/2022    Admit Date: 6/14/2022     Discharge Needs Assessment     Row Name 06/15/22 1408       Living Environment    People in Home spouse;child(jovita), dependent    Name(s) of People in Home Antonio Bledsoe and 3 kids    Current Living Arrangements home    Primary Care Provided by self    Provides Primary Care For child(jovita)    Family Caregiver if Needed spouse    Family Caregiver Names Ladi    Quality of Family Relationships unable to assess  No family present during CM rounds    Able to Return to Prior Arrangements yes       Resource/Environmental Concerns    Resource/Environmental Concerns none    Transportation Concerns none       Transition Planning    Patient/Family Anticipates Transition to home with family    Patient/Family Anticipated Services at Transition none    Transportation Anticipated family or friend will provide       Discharge Needs Assessment    Readmission Within the Last 30 Days no previous admission in last 30 days    Equipment Currently Used at Home none    Concerns to be Addressed denies needs/concerns at this time    Anticipated Changes Related to Illness none    Equipment Needed After Discharge none    Provided Post Acute Provider List? N/A    N/A Provider List Comment Anticipate routine home               Discharge Plan     Row Name 06/15/22 141       Plan    Plan D/C plan: Anticipate routine home with family    Patient/Family in Agreement with Plan yes    Plan Comments Met with patient at bedside. Pt lives at home with wife Ladi and their 3 children. Pt is IADL, including driving. Ladi to provide transportation at time of d/c. Pt denies use of DME at home. PCP and pharmacy confirmed. No financial barriers to meds. No previous use of HH agencies. SW consulted on case for Etoh. No CM d/c needs identified at this time.                      Demographic Summary     Row Name 06/15/22  1408       General Information    Admission Type inpatient    Arrived From emergency department    Referral Source admission list    Reason for Consult discharge planning    Preferred Language English               Functional Status     Row Name 06/15/22 1408       Functional Status    Usual Activity Tolerance good    Current Activity Tolerance good       Functional Status, IADL    Medications independent    Meal Preparation independent    Housekeeping independent    Laundry independent    Shopping independent              Met with patient in room wearing PPE: mask    Maintained distance greater than six feet and spent less than 15 minutes in the room.                 EMETERIO DUNLAP RN

## 2022-06-15 NOTE — ED NOTES
"Pt was found wandering in the hallway. States I'm looking for an ice machine\" Pt removed all monitoring equipment and dressed himself.  "

## 2022-06-15 NOTE — H&P
[facility]      Patient Name: Aleksandr Smith  : 1980  MRN: [id number]  Primary Care Physician:  Gibson Benoit Jr., MD  Date of admission: 2022      Subjective      Chief Complaint: Seizure activity at home    History of Present Illness: Aleksandr Smith is a 41 y.o. male with a history of alcohol abuse disorder and polysubstance abuse who presented to [facility] on 2022 after having witnessed seizure-like activity at home.    Patient was reportedly found by his wife having a seizure at home. Wife not present at bedside at time of admission. Pt mentioned prior hx of seizure activity when he tried to quit drinking alcohol about 2 years ago. He had been sober for about 1.5 years but had a relapse due to life psychosocial stressors. Pt now wants to quit again. He stated his last drink was about 3 days ago. He usually drinks about 1 pint of vodka daily. He mentioned being a functional alcoholic. 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 He mentioned feeling anxious and tremulous when he first arrived, but it had improved. He had mild headache, but no chest pain, SOB, n/v or abdominal pain.     While in the ED, patient received lorazepam 0.5mg IV, Mg sulfate 2g IV, and banana bag infusion.    Review of Systems   Constitutional: Negative for decreased appetite, diaphoresis and fever.   Eyes: Negative.    Cardiovascular: Negative for chest pain, dyspnea on exertion, leg swelling and palpitations.   Respiratory: Negative for cough and shortness of breath.    Endocrine: Negative.    Skin: Negative.    Musculoskeletal: Negative.    Gastrointestinal: Negative for abdominal pain, diarrhea, hematemesis, hematochezia, melena, nausea and vomiting.   Genitourinary: Negative for dysuria.   Neurological: Positive for headaches and tremors. Negative for  dizziness, focal weakness, paresthesias and weakness.   Psychiatric/Behavioral: Negative for altered mental status and hallucinations. The patient is not nervous/anxious.           Personal History     Past Medical History:   Diagnosis Date   • Alcohol abuse    • Substance abuse (HCC)        Past Surgical History:   Procedure Laterality Date   • FRACTURE SURGERY      - had left arm surgery after trauma from fall with subsequent fractures.    - had R thumb surgery after portion of thumb was cut off during accidental injury    Family History: Patient with hx of alcohol abuse disorder in his biological father and brother. He denied family hx of HTN and DM2.    Social History: Patient quit smoking about 3 months ago. He started smoking at the age of 22-23. He mentioned smoking 1/2-1ppd. He works as a supervisor for a construction company. He admit to heavy alcohol use. He drinks about 1 pint of vodka daily. He admits to recreational drug use with amphetamines, cocaine and opioids. He most recently snorted cocaine about 3-4 weeks ago.    Home Medications:  None    Allergies:  No Known Allergies    Objective      Vitals:   Temp:  [97.9 °F (36.6 °C)-98.6 °F (37 °C)] 97.9 °F (36.6 °C)  Heart Rate:  [] 91  Resp:  [18-22] 20  BP: (122-170)/() 142/92    Physical Exam   General:  Appears in no acute distress, non-toxic appearing  HEENT:  Normocephalic. Normal conjunctiva, EOM intact bilaterally, pupils equal and reactive to light. No mucosal pallor or cyanosis. No oral lesions. Tongue fasciculations  Respiratory: Respirations regular and unlabored at rest. Bilateral breath sounds with good air entry in all fields. No crackles, rubs or wheezes auscultated  Cardiovascular: S1S2 Regular rhythm, tachycardic. No murmur, rub or gallop. No pretibial pitting edema  Gastrointestinal: Abdomen soft, flat, non tender. Bowel sounds present. No rebound or guarding.   Musculoskeletal: Moves all extremities voluntarily. No abnormal  movements  Extremities: No digital clubbing or cyanosis. Hands mildly tremulous when arms outstretched.  Skin: Color pink. Skin warm and dry to touch. No rashes    Neuro: AAO x3, CN II-XII grossly intact, comprehension intact, follows commands appropriately  Psych: Mood and affect normal, pleasant and cooperative      Result Review    Result Review:  I have personally reviewed the results from the time of this admission to 6/15/2022 04:40 EDT and agree with these findings:  [x]  Laboratory  []  Microbiology  []  Radiology  []  EKG/Telemetry   []  Cardiology/Vascular   []  Pathology  []  Old records  []  Other:    LAB RESULTS:      Lab 06/15/22  0150 06/14/22  1733   WBC 5.70 7.50   HEMOGLOBIN 11.1* 12.2*   HEMATOCRIT 31.4* 35.2*   PLATELETS 195 234   NEUTROS ABS 3.60 5.70   LYMPHS ABS 1.20 1.00   MONOS ABS 0.90 0.80   EOS ABS 0.00 0.00   .1* 102.4*         Lab 06/15/22  0150 06/14/22  1733   SODIUM 134* 138   POTASSIUM 3.2* 3.1*   CHLORIDE 92* 95*   CO2 24.0 24.0   ANION GAP 18.0* 19.0*   BUN 12 11   CREATININE 0.59* 0.89   EGFR 125.0 110.4   GLUCOSE 87 140*   CALCIUM 8.4* 8.8   MAGNESIUM 1.5* 1.3*   PHOSPHORUS 2.6 1.9*         Lab 06/14/22  1733   TOTAL PROTEIN 7.1   ALBUMIN 3.80   GLOBULIN 3.3   ALT (SGPT) 110*   AST (SGOT) 248*   BILIRUBIN 3.8*   ALK PHOS 301*         Lab 06/14/22  1733   TROPONIN T <0.010         DATE OF EXAM:   6/14/2022 6:39 PM       PROCEDURE:   CT HEAD WO CONTRAST-          FINDINGS:   No acute intracranial hemorrhage or mass/mass effect. The ventricles and   sulci are stable and appropriate for age. The basilar cisterns are   patent. Normal gray-white matter differentiation is grossly maintained.   Moderate partial opacification of the ethmoid, sphenoid, and partially   imaged bilateral maxillary sinuses. Limited imaging of the orbits and   mastoid air cells is unremarkable. No acute osseous abnormality is   identified.       Impression:         1. No acute intracranial hemorrhage or  mass/mass effect.   2. Partially imaged moderate paranasal sinus disease.        DATE OF EXAM:   6/14/2022 6:39 PM       PROCEDURE:   CT ABDOMEN PELVIS W CONTRAST-       INDICATIONS:   elevated LFTs, elevated elijah       COMPARISON:   12/15/2014.       TECHNIQUE:   Routine transaxial slices were obtained through the abdomen and pelvis   after the intravenous administration of 100 mL of Isovue 370.   Reconstructed coronal and sagittal images were also obtained. Automated   exposure control and iterative construction methods were used.       FINDINGS:   Included lung bases are clear.       Diffusely decreased hepatic attenuation, indicating hepatic steatosis.   The gallbladder, spleen, pancreas, and adrenal glands are unremarkable.   Tiny subcentimeter cysts in both kidneys. Both kidneys are otherwise   unremarkable. The urinary bladder is nondistended. Diffuse urinary   bladder wall thickening could be accentuated by underdistention.       No significant colonic stool burden. Colonic diverticula, without CT   evidence of diverticulitis. Mild diffuse circumferential low attenuation   colonic wall thickening could be accentuated by underdistention but   could reflect sequela of chronic inflammatory bowel disease. No bowel   obstruction. The appendix is normal.       No free fluid in the abdomen or pelvis. No free intraperitoneal air. No   abdominal or pelvic lymphadenopathy is identified. No acute osseous   abnormality or concerning osseous lesion.       Impression:         1. Diffusely decreased hepatic attenuation, indicating hepatic   steatosis.   2. Diffuse urinary bladder wall thickening could be accentuated by   underdistention but could reflect a nonspecific cystitis. Recommend   correlating with urinalysis.   3. Mild diffuse circumferential low attenuation colonic wall thickening   could be accentuated by underdistention but could reflect sequela of   chronic inflammatory bowel disease.   4. Colonic diverticula,  without CT evidence of diverticulitis.            Assessment & Plan       Aleksandr Smith is a 41 y.o. male with a history of alcohol abuse disorder and polysubstance abuse who presented to [facility] on 6/14/2022 after having witnessed seizure-like activity at home admitted for alcohol withdrawal syndrome and electrolyte abnormalities.    Plan:   #Alcohol withdrawal syndrome  - CIWA protocol with PRN lorazepam  - seizure precautions    #Alcohol-induced hepatitis  Etiology for hyperbilirubinemia and elevated liver transaminases. Pt with undelry    #Electrolyte abnormalities  Noted hypokalemia, hypomagnesemia, hypophosphatemia  - check phosphorus  - replete electrolytes as needed; initiate electrolyte replacement protocolo    #Macrocytic anemia  Likely due to heavy alcohol use  - check Vit B12 and folate  - check iron, TIBC, and ferritin    #Iron overload  Suspect due to alcoholic liver disease  - recommend outpatient evaluation and f/u    #Polysubstance abuse  - SW to see pt in the AM for community resources for EtOH cessation programs    DVT prophylaxis:  Mechanical DVT prophylaxis orders are present.    CODE STATUS:    Code Status (Patient has no pulse and is not breathing): CPR (Attempt to Resuscitate)  Medical Interventions (Patient has pulse or is breathing): Full Support    Admission Status:  I believe this patient meets inpatient admission status.    I discussed the patient's findings and my recommendations with patient and nursing staff.      Signature: Electronically signed by Celi Lund MD, 06/15/22, 4:51 AM EDT.

## 2022-06-15 NOTE — PLAN OF CARE
Problem: Adult Inpatient Plan of Care  Goal: Plan of Care Review  Outcome: Ongoing, Progressing  Flowsheets (Taken 6/15/2022 0312)  Progress: improving  Plan of Care Reviewed With: patient  Outcome Evaluation: Patient rested throughout the night, no complaints at this time.  Goal: Patient-Specific Goal (Individualized)  Outcome: Ongoing, Progressing  Goal: Absence of Hospital-Acquired Illness or Injury  Outcome: Ongoing, Progressing  Intervention: Identify and Manage Fall Risk  Recent Flowsheet Documentation  Taken 6/15/2022 0253 by Zurdo Carmona RN  Safety Promotion/Fall Prevention:   safety round/check completed   room organization consistent   nonskid shoes/slippers when out of bed   clutter free environment maintained   assistive device/personal items within reach  Taken 6/15/2022 0136 by Zurdo Carmona RN  Safety Promotion/Fall Prevention:   safety round/check completed   room organization consistent   nonskid shoes/slippers when out of bed   clutter free environment maintained   assistive device/personal items within reach  Intervention: Prevent Skin Injury  Recent Flowsheet Documentation  Taken 6/15/2022 0136 by Zurdo Caromna RN  Skin Protection:   adhesive use limited   protective footwear used   transparent dressing maintained  Intervention: Prevent and Manage VTE (Venous Thromboembolism) Risk  Recent Flowsheet Documentation  Taken 6/15/2022 0136 by Zurdo Carmona RN  VTE Prevention/Management:   bilateral   sequential compression devices off  Intervention: Prevent Infection  Recent Flowsheet Documentation  Taken 6/15/2022 0253 by Zurdo Carmona RN  Infection Prevention:   single patient room provided   rest/sleep promoted   personal protective equipment utilized   hand hygiene promoted  Taken 6/15/2022 0136 by Zurdo Cramona RN  Infection Prevention:   single patient room provided   rest/sleep promoted   personal protective equipment utilized   hand hygiene promoted  Goal: Optimal Comfort  and Wellbeing  Outcome: Ongoing, Progressing  Intervention: Provide Person-Centered Care  Recent Flowsheet Documentation  Taken 6/15/2022 0136 by Zurdo Carmona, RN  Trust Relationship/Rapport:   care explained   emotional support provided   choices provided   empathic listening provided   questions answered   reassurance provided   questions encouraged   thoughts/feelings acknowledged  Goal: Readiness for Transition of Care  Outcome: Ongoing, Progressing  Intervention: Mutually Develop Transition Plan  Recent Flowsheet Documentation  Taken 6/15/2022 0135 by Zurdo Carmona, RN  Transportation Anticipated: family or friend will provide  Patient/Family Anticipated Services at Transition: none  Patient/Family Anticipates Transition to: home with family  Taken 6/15/2022 0134 by Zurdo Carmona, RN  Equipment Currently Used at Home: none   Goal Outcome Evaluation:  Plan of Care Reviewed With: patient        Progress: improving  Outcome Evaluation: Patient rested throughout the night, no complaints at this time.

## 2022-06-16 ENCOUNTER — READMISSION MANAGEMENT (OUTPATIENT)
Dept: CALL CENTER | Facility: HOSPITAL | Age: 42
End: 2022-06-16

## 2022-06-16 VITALS
HEIGHT: 72 IN | WEIGHT: 167.7 LBS | OXYGEN SATURATION: 100 % | HEART RATE: 83 BPM | BODY MASS INDEX: 22.71 KG/M2 | DIASTOLIC BLOOD PRESSURE: 89 MMHG | TEMPERATURE: 97.5 F | SYSTOLIC BLOOD PRESSURE: 136 MMHG | RESPIRATION RATE: 16 BRPM

## 2022-06-16 PROBLEM — R25.1 TREMOR: Status: ACTIVE | Noted: 2022-06-16

## 2022-06-16 PROBLEM — E83.42 HYPOMAGNESEMIA: Status: ACTIVE | Noted: 2022-06-16

## 2022-06-16 PROBLEM — E87.6 HYPOKALEMIA: Status: ACTIVE | Noted: 2022-06-16

## 2022-06-16 LAB
MAGNESIUM SERPL-MCNC: 1.8 MG/DL (ref 1.6–2.6)
POTASSIUM SERPL-SCNC: 4.1 MMOL/L (ref 3.5–5.2)

## 2022-06-16 PROCEDURE — 84132 ASSAY OF SERUM POTASSIUM: CPT | Performed by: HOSPITALIST

## 2022-06-16 PROCEDURE — 83735 ASSAY OF MAGNESIUM: CPT | Performed by: HOSPITALIST

## 2022-06-16 PROCEDURE — 96375 TX/PRO/DX INJ NEW DRUG ADDON: CPT

## 2022-06-16 PROCEDURE — 25010000002 ONDANSETRON PER 1 MG: Performed by: HOSPITALIST

## 2022-06-16 PROCEDURE — G0378 HOSPITAL OBSERVATION PER HR: HCPCS

## 2022-06-16 PROCEDURE — 36415 COLL VENOUS BLD VENIPUNCTURE: CPT | Performed by: HOSPITALIST

## 2022-06-16 PROCEDURE — 99217 PR OBSERVATION CARE DISCHARGE MANAGEMENT: CPT | Performed by: INTERNAL MEDICINE

## 2022-06-16 RX ADMIN — ONDANSETRON 4 MG: 2 INJECTION INTRAMUSCULAR; INTRAVENOUS at 10:30

## 2022-06-16 RX ADMIN — LORAZEPAM 1 MG: 1 TABLET ORAL at 10:30

## 2022-06-16 RX ADMIN — Medication 10 ML: at 10:30

## 2022-06-16 RX ADMIN — ALUMINA, MAGNESIA, AND SIMETHICONE 15 ML: 2400; 2400; 240 SUSPENSION ORAL at 10:30

## 2022-06-16 RX ADMIN — Medication 2 PACKET: at 10:30

## 2022-06-16 NOTE — PAYOR COMM NOTE
"CANCEL INPT CASE# [id number]    ------------  Status changed to observation level of care.     =======    THANK YOU,    KEIRY Alcaraz, RN  Utilization Review  [facility]  Phone: [phone]  Fax: [phone]      NPI: [id number]  Tax ID: [id number]        Steve Smith (41 y.o. Male)             Date of Birth   1980    Social Security Number       Address   [address] [place] [address]    Home Phone   [phone]    MRN   [id number]       [employer]   Other    Marital Status                               Admission Date   6/14/22    Admission Type   Emergency    Admitting Provider   Augie Lee MD    Attending Provider   Augie Lee MD    Department, Room/Bed   [facility] 3A MEDICAL INPATIENT, 300/1       Discharge Date       Discharge Disposition   Home or Self Care    Discharge Destination                               Attending Provider: Augie Lee MD    Allergies: No Known Allergies    Isolation: None   Infection: None   Code Status: CPR   Advance Care Planning Activity    Ht: 182.9 cm (72\")   Wt: 76.1 kg (167 lb 11.2 oz)    Admission Cmt: None   Principal Problem: Alcohol withdrawal seizure without complication (HCC) [F10.230,R56.9]                 Active Insurance as of 6/14/2022     Primary Coverage     Payor Plan Insurance Group Employer/Plan Group    [id number]     Payor Plan Address Payor Plan Phone Number Payor Plan Fax Number Effective Dates    MAIL STOP:   4/1/2021 - None Entered    [address] [address]       [facility] [address]       Subscriber Name Subscriber Birth Date Member ID       STEVE SMITH 1980 [id number]                 Emergency Contacts      (Rel.) Home Phone Work Phone Mobile Phone    RL SMITH (Spouse) -- -- [phone]            Discharge Order (From admission, onward)     Start     Ordered    06/16/22 1239  Discharge patient  Once "        Expected Discharge Date: 06/16/22    Expected Discharge Time: Midday    Discharge Disposition: Home or Self Care    Physician of Record for Attribution - Please select from Treatment Team: YULY WING [[id number]]    Review needed by CMO to determine Physician of Record: No       Question Answer Comment   Physician of Record for Attribution - Please select from Treatment Team YULY WING    Review needed by CMO to determine Physician of Record No        06/16/22 1247

## 2022-06-16 NOTE — DISCHARGE INSTRUCTIONS
Patient was advised to follow-up with his primary care physician who will review his current medications.

## 2022-06-16 NOTE — PLAN OF CARE
Goal Outcome Evaluation:              Outcome Evaluation: Patient resting abed, no distress noted. Patient stated that he is feeling a lot better. He also stated that he never wants to be in this situation again and is never going to drink again. Patient stated that he has plans on getting support when he gets discharged. Patient does hope to be discharged soon. Will continue to monitor.

## 2022-06-16 NOTE — DISCHARGE SUMMARY
[facility] Medicine Services  DISCHARGE SUMMARY    Patient Name: Aleksandr Smith  : 1980  MRN: [id number]    Date of Admission: 2022  Discharge Diagnosis: Alcohol withdrawal seizure./Methamphetamine intoxication.  Date of Discharge: 2022.  Primary Care Physician: Gibson Benoit Jr., MD      Presenting Problem:   Hypokalemia [E87.6]  Hypomagnesemia [E83.42]  Alcohol abuse [F10.10]  Tremor [R25.1]  Alcohol withdrawal seizure without complication (HCC) [F10.230, R56.9]    Active and Resolved Hospital Problems:  Active Hospital Problems    Diagnosis POA   • **Alcohol withdrawal seizure without complication (HCC) [F10.230, R56.9] Yes     Priority: High   • Methamphetamine intoxication (HCC) [F15.929] Yes     Priority: High   • Hypokalemia [E87.6] Yes     Priority: Low   • Hypomagnesemia [E83.42] Yes     Priority: Low   • Tremor [R25.1] Yes     Priority: Low   • Alcohol use disorder, moderate, dependence (HCC) [F10.20] Yes   • Polysubstance abuse (HCC) [F19.10] Yes      Resolved Hospital Problems   No resolved problems to display.         Hospital Course   From previous notes and with some minor updates.    Hospital Course:    Patient is a 41 y.o. male with a history of polysubstance abuse and alcohol use disorder who presented to [facility] on 2022 after having witnessed seizure-like activity at home.  Patient was reportedly found by his wife having a seizure at home. Wife not present at bedside at time of admission. Pt mentioned prior hx of seizure activity when he tried to quit drinking alcohol about 2 years ago. He had been sober for about 1.5 years but had a relapse due to life psychosocial stressors. Pt now wants to quit again. He stated his last drink was about 3 days ago. He usually drinks about 1 pint of vodka daily. He mentioned being a functional alcoholic. 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 He mentioned feeling anxious and tremulous when he first arrived, but it had improved. He had mild headache, but no chest pain, SOB, n/v or abdominal pain.   Patient was seen in the emergency room and while in the ED, patient received lorazepam 0.5mg IV, Mg sulfate 2g IV, and banana bag infusion.  potassium & sodium phosphates, 2 packet, Oral, 4x Daily With Meals & Nightly  sodium chloride, 10 mL, Intravenous, Q12H  Alcohol withdrawal was treated with [facility] protocol.  Alcohol cessation counseling was completed.  Methamphetamine intoxication was treated with supportive care.  Narcotic cessation counseling was completed.  Hypokalemia was treated with potassium replacement.  Hypomagnesemia was treated with magnesium replacement.  Patient reported complete resolution of his symptoms after over 48 hours in the hospital and requested to be discharged home.  Patient was advised to take his medications as prescribed.  The discharge medications are as per medication reconciliation list.  Patient was advised to follow-up with his primary care physician within 3 to 5 days of discharge.  Patient was advised to return to the emergency department if he experiences any recurrence of his symptoms.  Patient and family agreed with the plan and patient was discharged in stable condition.             DISCHARGE Follow Up Recommendations for labs and diagnostics:    Patient was advised to follow-up with his primary care physician who will review his current medications.      Reasons For Change In Medications and Indications for New Medications:      Day of Discharge     Vital Signs:  Temp:  [97.5 °F (36.4 °C)-98.2 °F (36.8 °C)] 97.5 °F (36.4 °C)  Heart Rate:  [78-83] 83  Resp:  [16-18] 16  BP: (136-148)/(89-99) 136/89    Physical Exam:  Physical Exam  Constitutional:       Appearance: Normal appearance.   HENT:      Head: Normocephalic.      Nose: Nose normal.       Mouth/Throat:      Mouth: Mucous membranes are dry.      Pharynx: Oropharynx is clear.   Eyes:      Extraocular Movements: Extraocular movements intact.      Conjunctiva/sclera: Conjunctivae normal.      Pupils: Pupils are equal, round, and reactive to light.   Cardiovascular:      Rate and Rhythm: Normal rate and regular rhythm.      Pulses: Normal pulses.      Heart sounds: No murmur heard.    No friction rub. No gallop.   Pulmonary:      Effort: Pulmonary effort is normal.      Breath sounds: No stridor. No wheezing or rales.   Chest:      Chest wall: No tenderness.   Abdominal:      General: Bowel sounds are normal. There is no distension.      Palpations: Abdomen is soft.      Tenderness: There is no abdominal tenderness. There is no right CVA tenderness or guarding.   Musculoskeletal:         General: No swelling, tenderness, deformity or signs of injury.      Cervical back: Normal range of motion. No rigidity.      Right lower leg: No edema.      Left lower leg: No edema.   Skin:     General: Skin is warm and dry.      Capillary Refill: Capillary refill takes less than 2 seconds.      Coloration: Skin is not jaundiced.      Findings: No bruising, erythema, lesion or rash.   Neurological:      Comments: No facial asymmetry noted.  Gait and station not tested.   Psychiatric:      Comments: No agitation.              Pertinent  and/or Most Recent Results     LAB RESULTS:      Lab 06/15/22  0150 06/14/22  1733   WBC 5.70 7.50   HEMOGLOBIN 11.1* 12.2*   HEMATOCRIT 31.4* 35.2*   PLATELETS 195 234   NEUTROS ABS 3.60 5.70   LYMPHS ABS 1.20 1.00   MONOS ABS 0.90 0.80   EOS ABS 0.00 0.00   .1* 102.4*         Lab 06/16/22  0533 06/15/22  0942 06/15/22  0150 06/14/22  1733   SODIUM  --   --  134* 138   POTASSIUM 4.1 4.5 3.2* 3.1*   CHLORIDE  --   --  92* 95*   CO2  --   --  24.0 24.0   ANION GAP  --   --  18.0* 19.0*   BUN  --   --  12 11   CREATININE  --   --  0.59* 0.89   EGFR  --   --  125.0 110.4   GLUCOSE  --    --  87 140*   CALCIUM  --   --  8.4* 8.8   MAGNESIUM 1.8  --  1.5* 1.3*   PHOSPHORUS  --   --  2.6 1.9*         Lab 06/14/22  1733   TOTAL PROTEIN 7.1   ALBUMIN 3.80   GLOBULIN 3.3   ALT (SGPT) 110*   AST (SGOT) 248*   BILIRUBIN 3.8*   ALK PHOS 301*         Lab 06/14/22  1733   TROPONIN T <0.010             Lab 06/14/22  1733   IRON 164*   IRON SATURATION 74*   TIBC 221*   TRANSFERRIN 148*   FERRITIN 3,062.00*   FOLATE 4.66*   VITAMIN B 12 894         Brief Urine Lab Results     None        Microbiology Results (last 10 days)     Procedure Component Value - Date/Time    COVID PRE-OP / PRE-PROCEDURE SCREENING ORDER (NO ISOLATION) - Swab, Nasopharynx [[id number]]  (Normal) Collected: 06/15/22 0221    Lab Status: Final result Specimen: Swab from Nasopharynx Updated: 06/15/22 0509    Narrative:      The following orders were created for panel order COVID PRE-OP / PRE-PROCEDURE SCREENING ORDER (NO ISOLATION) - Swab, Nasopharynx.  Procedure                               Abnormality         Status                     ---------                               -----------         ------                     COVID-19,CEPHEID/REBECA,CO...[[id number]]  Normal              Final result                 Please view results for these tests on the individual orders.    COVID-19,CEPHEID/REBECA,COR/ASHLEY/PAD/JENNY IN-HOUSE(OR EMERGENT/ADD-ON),NP SWAB IN TRANSPORT MEDIA 3-4 HR TAT, RT-PCR - Swab, Nasopharynx [[id number]]  (Normal) Collected: 06/15/22 0221    Lab Status: Final result Specimen: Swab from Nasopharynx Updated: 06/15/22 0509     COVID19 Not Detected    Narrative:      Fact sheet for providers: https://www.fda.gov/media/144480/download     Fact sheet for patients: https://www.fda.gov/media/978986/download  Fact sheet for providers: https://www.fda.gov/media/079070/download     Fact sheet for patients: https://www.fda.gov/media/578811/download          CT Head Without Contrast    Result Date: 6/14/2022  Impression:  1. No acute  intracranial hemorrhage or mass/mass effect. 2. Partially imaged moderate paranasal sinus disease.  Electronically Signed By-Moustapha Quezada MD On:6/14/2022 6:59 PM This report was finalized on [id number] by  Moustapha Quezada MD.    CT Abdomen Pelvis With Contrast    Result Date: 6/14/2022  Impression:  1. Diffusely decreased hepatic attenuation, indicating hepatic steatosis. 2. Diffuse urinary bladder wall thickening could be accentuated by underdistention but could reflect a nonspecific cystitis. Recommend correlating with urinalysis. 3. Mild diffuse circumferential low attenuation colonic wall thickening could be accentuated by underdistention but could reflect sequela of chronic inflammatory bowel disease. 4. Colonic diverticula, without CT evidence of diverticulitis.  Electronically Signed By-Moustapha Quezada MD On:6/14/2022 7:31 PM This report was finalized on [id number] by  Moustapha Quezada MD.                  Labs Pending at Discharge:      Procedures Performed           Consults:   Consults     Date and Time Order Name Status Description    6/14/2022  8:57 PM Hospitalist (on-call MD unless specified)              Discharge Details        Discharge Medications      Patient Not Prescribed Medications Upon Discharge         No Known Allergies      Discharge Disposition: Stable.  Home or Self Care    Diet:  Hospital:  Diet Order   Procedures   • Diet Regular         Discharge Activity: As tolerated.        CODE STATUS:  Code Status and Medical Interventions:   Ordered at: 06/14/22 2125     Code Status (Patient has no pulse and is not breathing):    CPR (Attempt to Resuscitate)     Medical Interventions (Patient has pulse or is breathing):    Full Support         No future appointments.        Time spent on Discharge including face to face service:  40  minutes    This patient has been examined wearing appropriate Personal Protective Equipment and discussed with hospital infection control department, state health  department, infectious disease specialist and pulmonologist. 06/16/22      Signature: Electronically signed by Augie Lee MD, FACP, 06/16/22, 12:42 PM EDT.

## 2022-06-16 NOTE — CASE MANAGEMENT/SOCIAL WORK
Continued Stay Note  JAYNA Huang     Patient Name: Aleksandr Smith  MRN: [id number]  Today's Date: 6/16/2022    Admit Date: 6/14/2022     Discharge Plan     Row Name 06/16/22 1123       Plan    Plan D/C plan: Anticipate routine home with family.    Patient/Family in Agreement with Plan yes    Plan Comments Anticipate routine home when medically ready.            Phone communication or documentation only - no physical contact with patient or family.               EMETERIO DUNLAP RN

## 2022-06-16 NOTE — CASE MANAGEMENT/SOCIAL WORK
Case Management Discharge Note      Final Note: Home    Provided Post Acute Provider List?: N/A  N/A Provider List Comment: Anticipate routine home    Selected Continued Care - Discharged on 6/16/2022 Admission date: 6/14/2022 - Discharge disposition: Home or Self Care            Transportation Services  Private: Car    Final Discharge Disposition Code: 01 - home or self-care

## 2022-06-16 NOTE — PAYOR COMM NOTE
"ADDITIONAL CLINICAL FOR CASE# [id number]    Per nursing assessment this AM CIWA score 9, increased from 4. Patient requiring PRN IV Ativan and PRN IV Zofran.   ========  AUTHORIZATION PENDING:   PLEASE FAX OR CALL DETERMINATION TO CONTACT BELOW:       THANK YOU,    KEIRY Alcaraz, RN  Utilization Review  [facility]  Phone: [phone]  Fax: [phone]      NPI: [id number]  Tax ID: [id number]        Steve Smith (41 y.o. Male)             Date of Birth   1980    Social Security Number       Address   [address] [place] [address]    Home Phone   [phone]    MRN   [id number]       Caodaism   Other    Marital Status                               Admission Date   6/14/22    Admission Type   Emergency    Admitting Provider   Augie Lee MD    Attending Provider   Augie Lee MD    Department, Room/Bed   [facility] 3A MEDICAL INPATIENT, 300/1       Discharge Date       Discharge Disposition       Discharge Destination                               Attending Provider: Augie Lee MD    Allergies: No Known Allergies    Isolation: None   Infection: None   Code Status: CPR   Advance Care Planning Activity    Ht: 182.9 cm (72\")   Wt: 76.1 kg (167 lb 11.2 oz)    Admission Cmt: None   Principal Problem: Alcohol withdrawal seizure without complication (HCC) [F10.230,R56.9]                 Active Insurance as of 6/14/2022     Primary Coverage     Payor Plan Insurance Group Employer/Plan Group    [id number]     Payor Plan Address Payor Plan Phone Number Payor Plan Fax Number Effective Dates    MAIL STOP:   4/1/2021 - None Entered    [address] [address]       [facility] [address]       Subscriber Name Subscriber Birth Date Member ID       STEVE SMITH 1980 [id number]                 Emergency Contacts      (Rel.) Home Phone Work Phone Mobile Phone    RL SMITH (Spouse) -- -- " [phone]            Vital Signs (last day)     Date/Time Temp Temp src Pulse Resp BP Patient Position SpO2    06/16/22 0725 97.5 (36.4) Oral 83 16 136/89 Lying 100    06/16/22 0049 98.2 (36.8) Oral 78 18 148/99 Lying 100    06/15/22 0721 98.4 (36.9) Oral 80 18 129/87 Lying 98    06/15/22 0348 97.9 (36.6) Oral 91 20 142/92 Lying 100    06/15/22 0117 98.6 (37) Oral 93 20 128/86 Lying 96          Facility-Administered Medications as of 6/16/2022   Medication Dose Route Frequency Provider Last Rate Last Admin   • acetaminophen (TYLENOL) tablet 650 mg  650 mg Oral Q4H PRN Celi Lund MD        Or   • acetaminophen (TYLENOL) 160 MG/5ML solution 650 mg  650 mg Oral Q4H PRN Celi Lund MD        Or   • acetaminophen (TYLENOL) suppository 650 mg  650 mg Rectal Q4H PRN Celi Lund MD       • aluminum-magnesium hydroxide-simethicone (MAALOX MAX) 400-400-40 MG/5ML suspension 15 mL  15 mL Oral Q6H PRN Celi Lund MD   15 mL at 06/16/22 1030   • [COMPLETED] iopamidol (ISOVUE-370) 76 % injection 100 mL  100 mL Intravenous Once in imaging Ameya Pimentel MD   100 mL at 06/14/22 1849   • [COMPLETED] LORazepam (ATIVAN) injection 0.5 mg  0.5 mg Intravenous Once Kadie Blanton PA   0.5 mg at 06/14/22 1819   • LORazepam (ATIVAN) tablet 1 mg  1 mg Oral Q2H PRN Celi Lund MD   1 mg at 06/16/22 1030    Or   • LORazepam (ATIVAN) injection 1 mg  1 mg Intravenous Q2H PRN Celi Lund MD   1 mg at 06/14/22 2342    Or   • LORazepam (ATIVAN) tablet 2 mg  2 mg Oral Q1H PRCeli Yoder MD        Or   • LORazepam (ATIVAN) injection 2 mg  2 mg Intravenous Q1H PRCeli Yoder MD        Or   • LORazepam (ATIVAN) injection 2 mg  2 mg Intravenous Q15 Min PRN Celi Lund MD        Or   • LORazepam (ATIVAN) injection 2 mg  2 mg Intramuscular Q15 Min PRN Celi Lund MD       • Magnesium Sulfate 2 gram infusion - Mg less than or equal to 1.5 mg/dL  2 g Intravenous PRN Celi Lund MD 25 mL/hr at  06/15/22 0253 2 g at 06/15/22 0253    Or   • Magnesium Sulfate 1 gram infusion - Mg 1.6-1.9 mg/dL  1 g Intravenous PRN Celi Lund MD       • [COMPLETED] magnesium sulfate 2g/50 mL (PREMIX) infusion  2 g Intravenous Once Kadie Blanton PA   Stopped at 22   • melatonin tablet 5 mg  5 mg Oral Nightly PRN Celi Lund MD       • ondansetron (ZOFRAN) tablet 4 mg  4 mg Oral Q6H PRN Celi Lund MD        Or   • ondansetron (ZOFRAN) injection 4 mg  4 mg Intravenous Q6H PRN Celi Lund MD   4 mg at 22 1030   • oxyCODONE (ROXICODONE) immediate release tablet 5 mg  5 mg Oral Q4H PRN Augie Lee MD       • potassium & sodium phosphates (PHOS-NAK) 280-160-250 MG packet - for Phosphorus less than 1.25 mg/dL  2 packet Oral Q6H PRN Celi Lund MD        Or   • potassium & sodium phosphates (PHOS-NAK) 280-160-250 MG packet - for Phosphorus 1.25 - 2.5 mg/dL  2 packet Oral Q6H PRN Celi Lund MD       • potassium & sodium phosphates (PHOS-NAK) oral packet  2 packet Oral 4x Daily With Meals & Nightly Celi Lund MD   2 packet at 22 1030   • [] potassium chloride (K-DUR,KLOR-CON) CR tablet 40 mEq  40 mEq Oral Q4H Celi Lund MD       • potassium chloride (K-DUR,KLOR-CON) CR tablet 40 mEq  40 mEq Oral PRN Celi Lund MD   40 mEq at 06/15/22 0532   • potassium chloride (KLOR-CON) packet 40 mEq  40 mEq Oral PRN Celi Lund MD       • sodium chloride 0.9 % flush 10 mL  10 mL Intravenous PRN Kadie Blanton PA       • sodium chloride 0.9 % flush 10 mL  10 mL Intravenous Q12H Celi Lund MD   10 mL at 22 1030   • sodium chloride 0.9 % flush 10 mL  10 mL Intravenous PRN Celi Lund MD       • [COMPLETED] thiamine (B-1) 100 mg, folic acid 1 mg in sodium chloride 0.9 % 1,000 mL infusion  1,000 mL/hr Intravenous Once Kadie Blanton PA   Stopped at 22        Physician Progress Notes (last 24 hours)      Augie Lee MD at  06/15/22 1646              [facility] Medicine Services Daily Progress Note    Patient Name: Aleksandr Smith  : 1980  MRN: [id number]  Primary Care Physician:  Gibson Benoit Jr., MD  Date of admission: 2022      Subjective      Chief Complaint: Seizure-like episode.      Patient Reports:        6/15/2022.  Patient was seen and examined.  Patient reported slight improvement in his symptoms.  No overnight events noted.      Review of Systems   Constitutional: Negative.   HENT: Negative.    Eyes: Negative.    Cardiovascular: Negative.    Respiratory: Negative.    Endocrine: Negative.    Hematologic/Lymphatic: Negative.    Skin: Negative.    Musculoskeletal: Negative.    Gastrointestinal: Negative.    Genitourinary: Negative.    Neurological: Negative.    Psychiatric/Behavioral: Negative.    Allergic/Immunologic: Negative.             Objective      Vitals:   Temp:  [97.9 °F (36.6 °C)-98.6 °F (37 °C)] 98.4 °F (36.9 °C)  Heart Rate:  [] 80  Resp:  [18-20] 18  BP: (122-142)/(85-93) 129/87    Physical Exam  Vitals and nursing note reviewed.   Constitutional:       General: He is not in acute distress.  HENT:      Head: Normocephalic.      Nose: Nose normal.      Mouth/Throat:      Mouth: Mucous membranes are dry.      Pharynx: Oropharynx is clear.   Eyes:      Extraocular Movements: Extraocular movements intact.      Conjunctiva/sclera: Conjunctivae normal.      Pupils: Pupils are equal, round, and reactive to light.   Cardiovascular:      Pulses: Normal pulses.      Heart sounds: No murmur heard.    No friction rub. No gallop.      Comments: S1 and S2 present.  No tachycardia.  Pulmonary:      Breath sounds: No stridor. No wheezing or rales.   Chest:      Chest wall: No tenderness.   Abdominal:      General: Bowel sounds are normal. There is no distension.      Palpations: Abdomen is soft.      Tenderness: There is no abdominal tenderness. There is no right CVA tenderness or  guarding.   Musculoskeletal:         General: No swelling, tenderness, deformity or signs of injury.      Cervical back: Normal range of motion. No rigidity.      Right lower leg: No edema.      Left lower leg: No edema.   Skin:     General: Skin is warm and dry.      Capillary Refill: Capillary refill takes less than 2 seconds.      Coloration: Skin is not jaundiced.      Findings: No bruising, erythema, lesion or rash.   Neurological:      Mental Status: He is alert.      Comments: No facial asymmetry noted.  Gait and station not tested.   Psychiatric:      Comments: No agitation.               Result Review    Result Review:  I have personally reviewed the results from the time of this admission to 6/15/2022 16:46 EDT and agree with these findings:  [x]  Laboratory  [x]  Microbiology  [x]  Radiology  []  EKG/Telemetry   []  Cardiology/Vascular   []  Pathology  []  Old records  []  Other:  Most notable findings include:           Assessment & Plan    From previous notes and with minor updates.  Brief Patient Summary:     Patient is a 41 y.o. male with a history of polysubstance abuse and alcohol use disorder who presented to [facility] on 6/14/2022 after having witnessed seizure-like activity at home.  Patient was reportedly found by his wife having a seizure at home. Wife not present at bedside at time of admission. Pt mentioned prior hx of seizure activity when he tried to quit drinking alcohol about 2 years ago. He had been sober for about 1.5 years but had a relapse due to life psychosocial stressors. Pt now wants to quit again. He stated his last drink was about 3 days ago. He usually drinks about 1 pint of vodka daily. He mentioned being a functional alcoholic. 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 He mentioned feeling anxious and tremulous when he first arrived, but it had improved. He  had mild headache, but no chest pain, SOB, n/v or abdominal pain.   Patient was seen in the emergency room and while in the ED, patient received lorazepam 0.5mg IV, Mg sulfate 2g IV, and banana bag infusion.    potassium & sodium phosphates, 2 packet, Oral, 4x Daily With Meals & Nightly  sodium chloride, 10 mL, Intravenous, Q12H             Active Hospital Problems:  Active Hospital Problems    Diagnosis    • Alcohol use disorder, moderate, dependence (HCC)  Complete alcohol cessation counseling.        • Alcohol withdrawal seizure without complication (HCC)  Treat with CIWA protocol.        •   Polysubstance abuse.  Complaints polysubstance cessation counseling.            Resume appropriate patient's home medications for other chronic medical conditions.  Continue the present level of care.  Patient and family agreed with the plan of care.      DVT prophylaxis:  Mechanical DVT prophylaxis orders are present.    CODE STATUS:    Code Status (Patient has no pulse and is not breathing): CPR (Attempt to Resuscitate)  Medical Interventions (Patient has pulse or is breathing): Full Support      Disposition: Pending patient's clinical improvement.    This patient has been examined wearing appropriate Personal Protective Equipment and discussed with hospital infection control department, [facility], infectious disease specialist and pulmonologist. 06/15/22      Electronically signed by Augie Lee MD, FACP,  06/15/22, 16:46 EDT.      [facility] Hospitalist Team             Electronically signed by Augie Lee MD at 06/15/22 5171

## 2022-06-17 ENCOUNTER — TRANSITIONAL CARE MANAGEMENT TELEPHONE ENCOUNTER (OUTPATIENT)
Dept: CALL CENTER | Facility: HOSPITAL | Age: 42
End: 2022-06-17

## 2022-06-17 LAB — QT INTERVAL: 368 MS

## 2022-06-17 NOTE — OUTREACH NOTE
Call Center TCM Note    Flowsheet Row Responses   [facility] patient discharged from? [facility]   Does the patient have one of the following disease processes/diagnoses(primary or secondary)? Other   TCM attempt successful? No   Revoked Reason Phone Issues  [Disconnected numbers. (all of them)]          Mansoor Tate RN    6/17/2022, 10:28 EDT

## 2022-06-17 NOTE — OUTREACH NOTE
Prep Survey    Flowsheet Row Responses   [facility]   Is LACE score < 7 ? Yes   Emergency Room discharge w/ pulse ox? No   Eligibility [facility]   Date of Admission 06/14/22   Date of Discharge 06/16/22   Discharge Disposition Home or Self Care   Discharge diagnosis Alcohol withdrawal seizure without complication    Does the patient have one of the following disease processes/diagnoses(primary or secondary)? Other   Does the patient have Home health ordered? No   Is there a DME ordered? No   Prep survey completed? Yes          PAUL RICHARDS - Registered Nurse

## 2022-06-20 NOTE — CASE MANAGEMENT/SOCIAL WORK
Case Management Discharge Note      Final Note: Routine home    Provided Post Acute Provider List?: N/A  N/A Provider List Comment: Anticipate routine home    Selected Continued Care - Discharged on 6/16/2022 Admission date: 6/14/2022 - Discharge disposition: Home or Self Care    Destination    No services have been selected for the patient.              Durable Medical Equipment    No services have been selected for the patient.              Dialysis/Infusion    No services have been selected for the patient.              Home Medical Care    No services have been selected for the patient.              Therapy    No services have been selected for the patient.              Community Resources    No services have been selected for the patient.              Community & DME    No services have been selected for the patient.                  Transportation Services  Private: Car    Final Discharge Disposition Code: 01 - home or self-care

## 2023-11-24 ENCOUNTER — HOSPITAL ENCOUNTER (EMERGENCY)
Facility: HOSPITAL | Age: 43
Discharge: HOME | End: 2023-11-24
Attending: EMERGENCY MEDICINE
Payer: OTHER MISCELLANEOUS

## 2023-11-24 ENCOUNTER — APPOINTMENT (OUTPATIENT)
Dept: GENERAL RADIOLOGY | Facility: HOSPITAL | Age: 43
End: 2023-11-24
Payer: OTHER MISCELLANEOUS

## 2023-11-24 ENCOUNTER — APPOINTMENT (OUTPATIENT)
Dept: CT IMAGING | Facility: HOSPITAL | Age: 43
End: 2023-11-24
Payer: OTHER MISCELLANEOUS

## 2023-11-24 VITALS
HEART RATE: 67 BPM | RESPIRATION RATE: 18 BRPM | DIASTOLIC BLOOD PRESSURE: 70 MMHG | OXYGEN SATURATION: 98 % | TEMPERATURE: 98 F | WEIGHT: 200 LBS | BODY MASS INDEX: 27.09 KG/M2 | HEIGHT: 72 IN | SYSTOLIC BLOOD PRESSURE: 126 MMHG

## 2023-11-24 DIAGNOSIS — S82.54XA NONDISPLACED FRACTURE OF MEDIAL MALLEOLUS OF RIGHT TIBIA, INITIAL ENCOUNTER FOR CLOSED FRACTURE: Primary | ICD-10-CM
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
## 2023-11-24 DIAGNOSIS — M25.571 ACUTE RIGHT ANKLE PAIN: ICD-10-CM

## 2023-11-24 PROCEDURE — 97162 PT EVAL MOD COMPLEX 30 MIN: CPT

## 2023-11-24 PROCEDURE — 99284 EMERGENCY DEPT VISIT MOD MDM: CPT

## 2023-11-24 PROCEDURE — 73700 CT LOWER EXTREMITY W/O DYE: CPT

## 2023-11-24 PROCEDURE — 73610 X-RAY EXAM OF ANKLE: CPT

## 2023-11-24 PROCEDURE — 71101 X-RAY EXAM UNILAT RIBS/CHEST: CPT

## 2023-11-24 RX ORDER — HYDROCODONE BITARTRATE AND ACETAMINOPHEN 7.5; 325 MG/1; MG/1
1 TABLET ORAL EVERY 6 HOURS PRN
Qty: 15 TABLET | Refills: 0 | Status: SHIPPED | OUTPATIENT
Start: 2023-11-24

## 2023-11-24 RX ORDER — HYDROCODONE BITARTRATE AND ACETAMINOPHEN 7.5; 325 MG/1; MG/1
1 TABLET ORAL ONCE
Status: COMPLETED | OUTPATIENT
Start: 2023-11-24 | End: 2023-11-24

## 2023-11-24 RX ADMIN — HYDROCODONE BITARTRATE AND ACETAMINOPHEN 1 TABLET: 7.5; 325 TABLET ORAL at 09:11

## 2023-11-24 NOTE — THERAPY EVALUATION
Patient Name: Aleksandr Smith  : 1980    MRN: [id number]                              Today's Date: 2023       Admit Date: 2023    Visit Dx:     ICD-10-CM ICD-9-CM   1. Nondisplaced fracture of medial malleolus of right tibia, initial encounter for closed fracture  S82.54XA 824.0   2. Acute right ankle pain  M25.571 719.47     338.19   3. Fall, initial encounter  W19.XXXA E888.9     Patient Active Problem List   Diagnosis    Syncope    Polysubstance abuse    Aspiration pneumonia    Alcohol withdrawal seizure without complication    Alcohol use disorder, moderate, dependence    Methamphetamine intoxication    Hypokalemia    Hypomagnesemia    Tremor     Past Medical History:   Diagnosis Date    Alcohol abuse     Substance abuse      Past Surgical History:   Procedure Laterality Date    FRACTURE SURGERY       History:  Pt is a 42 y/o M admitted to [facility] ED on 23  with complaints of persistent R medial ankle pain since falling off scaffolding 3 weeks ago. CT RLE (+) for longitudinal fracture extending to the base of the medial malleolus. There is no significant displacement of the medial malleolar fracture fragment. The ankle mortise remains intact without dislocation. CT RLE (+) for scarring associated with the anterior talofibular ligament and calcaneofibular ligament likely due to remote injury. PT consulted for evaluation for crutch training and RLE Ankle pain.    Assessment/Plan:   Pt presents with a diagnosis of R Ankle medial malleolus fracture with scarring noted on the R lateral ankle ligaments. He reports very minimal pain in the R ankle at this date while wearing the walking boot. PT educated pt on prognosis for condition, referral for OPPT following ortho appointment. He performs well with crutch training following education and completes 20' IND with use of crutches. He will be safe to d/c home with no additional therapy potential surgery.    Goals:   LTG 1: The patient will be  independent in HEP in order to decrease pain and improve tolerance to functional activities.  STATUS: Met       Clinical Impression       Row Name 11/24/23 1223          Pain    Pretreatment Pain Rating 3/10  -MB     Posttreatment Pain Rating 3/10  -MB     Pain Location - Side/Orientation Right  -MB     Pain Location medial  -MB     Pain Location - ankle  -MB     Pain Intervention(s) Nursing Notified;Emotional support;MD notified (Comment)  -MB       Row Name 11/24/23 1223          Plan of Care Review    Plan of Care Reviewed With patient  -MB     Progress improving  -MB       Row Name 11/24/23 1223          Therapy Assessment/Plan (PT)    Criteria for Skilled Interventions Met (PT) no  -MB     Therapy Frequency (PT) evaluation only  -MB     Predicted Duration of Therapy Intervention (PT) eval only  -MB       Row Name 11/24/23 1223          Vital Signs    O2 Delivery Pre Treatment room air  -MB     O2 Delivery Intra Treatment room air  -MB     O2 Delivery Post Treatment room air  -MB     Pre Patient Position Sitting  -MB     Intra Patient Position Standing  -MB     Post Patient Position Sitting  -MB       Row Name 11/24/23 1223          Positioning and Restraints    Pre-Treatment Position in bed  -MB     Post Treatment Position bed  -MB     In Bed sitting EOB;sitting  -MB               User Key  (r) = Recorded By, (t) = Taken By, (c) = Cosigned By      Initials Name Provider Type    Yung Atwood, PT Physical Therapist                               Physical Therapy Education       Title: PT OT SLP Therapies (Resolved)       Topic: Physical Therapy (Resolved)       Point: Mobility training (Resolved)       Learner Progress:  Not documented in this visit.                                  PT Recommendation and Plan     Plan of Care Reviewed With: patient  Progress: improving     Time Calculation:   PT Evaluation Complexity  History, PT Evaluation Complexity: 1-2 personal factors and/or comorbidities  Examination of  Body Systems (PT Eval Complexity): total of 3 or more elements  Clinical Presentation (PT Evaluation Complexity): evolving  Clinical Decision Making (PT Evaluation Complexity): moderate complexity  Overall Complexity (PT Evaluation Complexity): moderate complexity     PT Charges       Row Name 11/24/23 1224             Time Calculation    Start Time 1145  -MB      Stop Time 1210  -MB      Time Calculation (min) 25 min  -MB      PT Received On 11/24/23  -MB         Time Calculation- PT    Total Timed Code Minutes- PT 0 minute(s)  -MB                User Key  (r) = Recorded By, (t) = Taken By, (c) = Cosigned By      Initials Name Provider Type    Yung Atwood, PT Physical Therapist                       PT Discharge Summary  Anticipated Discharge Disposition (PT): home with assist    Yung Abraham PT  11/24/2023

## 2023-11-24 NOTE — ED PROVIDER NOTES
Subjective   History of Present Illness  Chief Complaint: Ankle injury    Patient is a 43-year-old male with no pertinent past medical history who presents to the ER per POV for right ankle pain for 2 weeks.  The patient reports that 2 weeks ago he fell 12 feet from scaffolding at work.  During the fall he injured his right ankle, and a wooden board hit his right rib cage.  He denies hitting his head, no head injury or LOC.  No syncope.  The patient reports edema and inability to bare weight due to significant pain.  He has been wearing an Aircast and treating his pain with ibuprofen but has had minimal relief or improvement in symptoms.  The patient also reports pain in his rib cage on the right side, he reports that when he fell he had a lot of bruising on his right arm and his right flank but the bruising has not resolved.  No abdominal pain nausea or vomiting.  He has no shortness of breath or chest pain at rest but states he has localized pain in the right lower ribs when he takes a big deep breath or coughs.  No hemoptysis.  No fever or chills.    PCP: None    History provided by:  Patient      Review of Systems   Constitutional:  Negative for chills and fever.   HENT:  Negative for sore throat and trouble swallowing.    Eyes: Negative.    Respiratory:  Negative for shortness of breath and wheezing.    Cardiovascular:  Negative for chest pain.        Right chest wall pain   Gastrointestinal:  Negative for abdominal pain, diarrhea, nausea and vomiting.   Endocrine: Negative.    Genitourinary:  Negative for dysuria.   Musculoskeletal:  Positive for arthralgias. Negative for myalgias.   Skin:  Negative for rash.   Allergic/Immunologic: Negative.    Neurological:  Negative for headaches.   Psychiatric/Behavioral:  Negative for behavioral problems.    All other systems reviewed and are negative.      Past Medical History:   Diagnosis Date    Alcohol abuse     Substance abuse        No Known Allergies    Past  Surgical History:   Procedure Laterality Date    FRACTURE SURGERY         Family History   Problem Relation Age of Onset    No Known Problems Mother     No Known Problems Father        Social History     Socioeconomic History    Marital status:    Tobacco Use    Smoking status: Every Day     Packs/day: 1     Types: Cigarettes   Substance and Sexual Activity    Alcohol use: Yes    Drug use: Not Currently    Sexual activity: Yes     Partners: Female           Objective   Physical Exam  Vitals and nursing note reviewed.   Constitutional:       Appearance: Normal appearance. He is normal weight.   Eyes:      Pupils: Pupils are equal, round, and reactive to light.   Cardiovascular:      Rate and Rhythm: Normal rate and regular rhythm.      Pulses: Normal pulses.      Heart sounds: Normal heart sounds. No murmur heard.  Pulmonary:      Effort: Pulmonary effort is normal.      Breath sounds: Normal breath sounds.      Comments: Right lower chest wall tenderness with palpation without crepitus or flail chest.  No bruising.  Chest:      Chest wall: Tenderness present.   Abdominal:      General: Abdomen is flat.   Musculoskeletal:         General: Swelling and tenderness present. No deformity or signs of injury.      Comments: Tenderness to palpation the medial lateral malleolus of the right ankle.  No tenderness palpation of the right foot.  Pedal pulses present 2+ bilaterally  Sensation to soft touch intact  Motor strength 5/5 bilaterally    No tenderness to palpation of the right lower leg or knee.   Skin:     General: Skin is warm.      Capillary Refill: Capillary refill takes less than 2 seconds.      Findings: Erythema present. No bruising or rash.   Neurological:      General: No focal deficit present.      Mental Status: He is alert and oriented to person, place, and time.   Psychiatric:         Mood and Affect: Mood normal.         Behavior: Behavior normal.         Procedures           ED Course  ED Course  "as of 11/24/23 1728   Fri Nov 24, 2023   1130 Waiting for physical therapy to evaluate the patient []      ED Course User Index  [] Kadie Blanton, PA    /70   Pulse 67   Temp 98 °F (36.7 °C)   Resp 18   Ht 182.9 cm (72\")   Wt 90.7 kg (200 lb)   SpO2 98%   BMI 27.12 kg/m²   Labs Reviewed - No data to display  Medications   HYDROcodone-acetaminophen (NORCO) 7.5-325 MG per tablet 1 tablet (1 tablet Oral Given 11/24/23 0911)     CT Lower Extremity Right Without Contrast    Result Date: 11/24/2023  Impression: 1.Longitudinal fracture extending to the base of the medial malleolus. There is no significant displacement of the medial malleolar fracture fragment. The ankle mortise remains intact without dislocation. 2.Mild to moderate changes of osteoarthritis are noted throughout the articulations of the hindfoot and midfoot. These changes are more pronounced at the posterior talocalcaneal articulation with prominent osteophytosis seen at the posterior margin. The findings are likely due to the presence of a prominent accessory os trigonum and chronic stress related changes. 3.Evidence for scarring associated with the anterior talofibular ligament and calcaneofibular ligament likely due to remote injury. Electronically Signed: Jose Acuna MD  11/24/2023 10:13 AM EST  Workstation ID: [id number]    XR Ankle 3+ View Right    Result Date: 11/24/2023  Impression: 1. No displaced right ankle fracture is identified. Question is raised of potential nondisplaced fracture oriented vertically in the medial malleolus. Consider CT imaging for further evaluation. 2. Marked diffuse right ankle soft tissue swelling. Electronically Signed: Marycruz Elliott MD  11/24/2023 9:23 AM EST  Workstation ID: [id number]    XR Ribs Right With PA Chest    Result Date: 11/24/2023  Impression: 1. Mildly displaced fractures of the right eighth, ninth and 10th ribs. 2. Chronic healed fractures of left posterior ribs. 3. No acute " cardiopulmonary findings. Electronically Signed: Ameya Alvarado MD  11/24/2023 9:23 AM EST  Workstation ID: [id number]                                          Medical Decision Making  Differential Dx (Includes but not limited to): Fracture contusion ligamentous injury or dislocation  Medical Records Reviewed: No pertinent records to review  Labs: N/A  Imaging: On my interpretation, x-ray ribs shows fractures of eighth ninth and 10th ribs without pneumothorax.  X-ray of the right ankle shows suspected fracture, CT lower extremity shows longitudinal fracture extending to the base of the medial malleolus.  Telemetry: N/A  Testing considered but not ordered: CT head patient denies headache or head injury  Nature of Complaint: Acute  Admission vs Discharge: Discharge  Discussion: While in the ED IV was placed and labs were obtained appropriate PPE was worn during exam and throughout all encounters with the patient.  Patient was given Norco for pain.  X-ray imaging significant for multiple rib fractures to the right lower lateral ribs without pneumothorax.  There is questionable abnormality of the x-ray of right ankle, CT was ordered and showed longitudinal fracture extending into the base of the medial malleolus, the ankle mortise remains intact.  Findings discussed with patient at bedside.  Patient was placed in tall boot and given crutches for ambulation.  Recommended decreased weightbearing until he follows up with Ortho for recheck.  Inspect reviewed, patient discharged prescription for Norco for pain.    I have ordered a CAM boot on the patient. The patient is able to ambulate. The patient requires stabilization due to acute medial malleolus fracture. The patient has the potential to benefit functionally from the brace because boot would provide stability to prevent further injury, dislocation or further trauma to the ankle or misalignment.      Problems Addressed:  Acute right ankle pain: acute illness or  injury  Fall, initial encounter: acute illness or injury  Nondisplaced fracture of medial malleolus of right tibia, initial encounter for closed fracture: acute illness or injury    Amount and/or Complexity of Data Reviewed  Radiology: ordered. Decision-making details documented in ED Course.    Risk  Prescription drug management.        Final diagnoses:   Nondisplaced fracture of medial malleolus of right tibia, initial encounter for closed fracture   Acute right ankle pain   Fall, initial encounter       ED Disposition  ED Disposition       ED Disposition   Discharge    Condition   Stable    Comment   --               PATIENT CONNECTION - [facility] [address]  [phone]  Schedule an appointment as soon as possible for a visit in 2 days  As needed, If symptoms worsen    Warren Horn II, MD  [address] [facility] [place] [address]  [phone]    Schedule an appointment as soon as possible for a visit in 2 days  As needed, If symptoms worsen         Medication List        New Prescriptions      HYDROcodone-acetaminophen 7.5-325 MG per tablet  Commonly known as: NORCO  Take 1 tablet by mouth Every 6 (Six) Hours As Needed for Moderate Pain.               Where to Get Your Medications        These medications were sent to [facility] [id number] - [place], [place] - [address] - [phone]  - [phone] FX  [address] [facility] [place] [address]      Phone: [phone]   HYDROcodone-acetaminophen 7.5-325 MG per tablet            Kadie Blanton PA  11/24/23 5427

## 2023-11-24 NOTE — DISCHARGE INSTRUCTIONS
Norco as needed for pain.  Keep boot on at all times with activity.  Use crutches for ambulation.  Try to keep the weight off of your foot as much as possible.    Rest, ice and elevate.  Apply ice in 10 to 15-minute increments as needed    Call Ortho specialist first thing Monday morning to schedule appointment to be seen    Return to the ER for new worsening symptoms

## 2024-03-01 ENCOUNTER — HOSPITAL ENCOUNTER (OUTPATIENT)
Dept: CARDIOLOGY | Facility: HOSPITAL | Age: 44
Discharge: HOME | End: 2024-03-01
Payer: MEDICAID

## 2024-03-01 ENCOUNTER — LAB (OUTPATIENT)
Dept: LAB | Facility: HOSPITAL | Age: 44
End: 2024-03-01
Payer: MEDICAID

## 2024-03-01 ENCOUNTER — TRANSCRIBE ORDERS (OUTPATIENT)
Dept: LAB | Facility: HOSPITAL | Age: 44
End: 2024-03-01
Payer: MEDICAID

## 2024-03-01 DIAGNOSIS — M12.571 TRAUMATIC ARTHROPATHY OF ANKLE, RIGHT: Primary | ICD-10-CM

## 2024-03-01 DIAGNOSIS — M12.571 TRAUMATIC ARTHROPATHY OF ANKLE, RIGHT: ICD-10-CM

## 2024-03-01 LAB
ALBUMIN SERPL-MCNC: 4.1 G/DL (ref 3.5–5.2)
ALBUMIN/GLOB SERPL: 1.5 G/DL
ALP SERPL-CCNC: 75 U/L (ref 39–117)
ALT SERPL W P-5'-P-CCNC: 15 U/L (ref 1–41)
ANION GAP SERPL CALCULATED.3IONS-SCNC: 7 MMOL/L (ref 5–15)
AST SERPL-CCNC: 18 U/L (ref 1–40)
BASOPHILS # BLD AUTO: 0 10*3/MM3 (ref 0–0.2)
BASOPHILS NFR BLD AUTO: 0.8 % (ref 0–1.5)
BILIRUB SERPL-MCNC: 0.2 MG/DL (ref 0–1.2)
BUN SERPL-MCNC: 15 MG/DL (ref 6–20)
BUN/CREAT SERPL: 21.7 (ref 7–25)
CALCIUM SPEC-SCNC: 9.3 MG/DL (ref 8.6–10.5)
CHLORIDE SERPL-SCNC: 101 MMOL/L (ref 98–107)
CO2 SERPL-SCNC: 30 MMOL/L (ref 22–29)
CREAT SERPL-MCNC: 0.69 MG/DL (ref 0.76–1.27)
DEPRECATED RDW RBC AUTO: 42.9 FL (ref 37–54)
EGFRCR SERPLBLD CKD-EPI 2021: 117.8 ML/MIN/1.73
EOSINOPHIL # BLD AUTO: 0.2 10*3/MM3 (ref 0–0.4)
EOSINOPHIL NFR BLD AUTO: 2.7 % (ref 0.3–6.2)
ERYTHROCYTE [DISTWIDTH] IN BLOOD BY AUTOMATED COUNT: 13.5 % (ref 12.3–15.4)
GLOBULIN UR ELPH-MCNC: 2.8 GM/DL
GLUCOSE SERPL-MCNC: 95 MG/DL (ref 65–99)
HCT VFR BLD AUTO: 39.3 % (ref 37.5–51)
HGB BLD-MCNC: 12.9 G/DL (ref 13–17.7)
LYMPHOCYTES # BLD AUTO: 2.7 10*3/MM3 (ref 0.7–3.1)
LYMPHOCYTES NFR BLD AUTO: 47.7 % (ref 19.6–45.3)
MCH RBC QN AUTO: 28.4 PG (ref 26.6–33)
MCHC RBC AUTO-ENTMCNC: 32.9 G/DL (ref 31.5–35.7)
MCV RBC AUTO: 86.3 FL (ref 79–97)
MONOCYTES # BLD AUTO: 0.6 10*3/MM3 (ref 0.1–0.9)
MONOCYTES NFR BLD AUTO: 10.6 % (ref 5–12)
NEUTROPHILS NFR BLD AUTO: 2.1 10*3/MM3 (ref 1.7–7)
NEUTROPHILS NFR BLD AUTO: 38.2 % (ref 42.7–76)
NRBC BLD AUTO-RTO: 0 /100 WBC (ref 0–0.2)
PLATELET # BLD AUTO: 186 10*3/MM3 (ref 140–450)
PMV BLD AUTO: 8.2 FL (ref 6–12)
POTASSIUM SERPL-SCNC: 4.7 MMOL/L (ref 3.5–5.2)
PROT SERPL-MCNC: 6.9 G/DL (ref 6–8.5)
RBC # BLD AUTO: 4.55 10*6/MM3 (ref 4.14–5.8)
SODIUM SERPL-SCNC: 138 MMOL/L (ref 136–145)
WBC NRBC COR # BLD AUTO: 5.6 10*3/MM3 (ref 3.4–10.8)

## 2024-03-01 PROCEDURE — 93005 ELECTROCARDIOGRAM TRACING: CPT | Performed by: ORTHOPAEDIC SURGERY

## 2024-03-01 PROCEDURE — 80053 COMPREHEN METABOLIC PANEL: CPT

## 2024-03-01 PROCEDURE — 85025 COMPLETE CBC W/AUTO DIFF WBC: CPT

## 2024-03-01 PROCEDURE — 36415 COLL VENOUS BLD VENIPUNCTURE: CPT

## 2024-03-02 LAB
QT INTERVAL: 409 MS
QTC INTERVAL: 444 MS

## 2025-01-02 ENCOUNTER — HOSPITAL ENCOUNTER (EMERGENCY)
Facility: HOSPITAL | Age: 45
Discharge: HOME | End: 2025-01-02
Attending: EMERGENCY MEDICINE
Payer: COMMERCIAL

## 2025-01-02 VITALS
TEMPERATURE: 97.5 F | HEART RATE: 89 BPM | WEIGHT: 206.79 LBS | HEIGHT: 71 IN | BODY MASS INDEX: 28.95 KG/M2 | RESPIRATION RATE: 17 BRPM | OXYGEN SATURATION: 98 % | DIASTOLIC BLOOD PRESSURE: 87 MMHG | SYSTOLIC BLOOD PRESSURE: 128 MMHG

## 2025-01-02 DIAGNOSIS — G56.31 RADIAL NERVE PALSY, RIGHT: Primary | ICD-10-CM

## 2025-01-02 PROCEDURE — 99282 EMERGENCY DEPT VISIT SF MDM: CPT

## 2025-01-02 NOTE — ED NOTES
Patient reports that he has weakenss to right hand, unable to flex at wrist, is able to move fingers but weakness reported limiting ADL's this morning. Hand is normal in temperture when compared to left, radial pulse is palpable. Fingers of both hands are cooler than palm, red, capillary refill is slightly delayed in both. No complaints of pain when touched.  Noted old scab on wound of left thumb along side lateral nail bed.  No additional redness, pus, swelling or drainage from that site.  He says that his hands are always cold like this.

## 2025-01-02 NOTE — ED PROVIDER NOTES
"Subjective   History of Present Illness  Patient is a 44-year-old male with no significant past medical history who presents with right wrist weakness. He reports waking up with numbness on the back of his right hand, which has not resolved. He describes the sensation as \"pins and needles\" and notes a distinct line where the numbness begins. He denies any fever or other symptoms. The patient recalls sleeping in an unusual position the previous night, which may have contributed to his symptoms.  States he cannot extend at wrist.  Review of Systems  See HPI.  Past Medical History:   Diagnosis Date    Alcohol abuse     Substance abuse        No Known Allergies    Past Surgical History:   Procedure Laterality Date    FRACTURE SURGERY         Family History   Problem Relation Age of Onset    No Known Problems Mother     No Known Problems Father        Social History     Socioeconomic History    Marital status:    Tobacco Use    Smoking status: Every Day     Current packs/day: 1.00     Types: Cigarettes   Substance and Sexual Activity    Alcohol use: Yes    Drug use: Not Currently    Sexual activity: Yes     Partners: Female           Objective   Physical Exam  No acute distress. Normocephalic. No scleral icterus. Moist oral mucosa. No observable neck masses on external visualization. No respiratory distress. No tachypnea or increased work of breathing. Normal heart rate. Intact distal pulses. Abdomen soft and nontender without peritoneal signs. Alert. Normal Speech. Right wrist drop with diminished sensation over the dorsum of the hand. Cranial nerves II-XII intact. Normal capillary refill in all digits of both hands. Intact radial pulses bilaterally. Sensation intact to light touch except for the dorsum of the right hand.  Normal strength in all 4 extremities except with extension at her right wrist and fingers.  Procedures           ED Course      /87 (BP Location: Left arm, Patient Position: Sitting)   " "Pulse 89   Temp 97.5 °F (36.4 °C) (Oral)   Resp 17   Ht 180.3 cm (71\")   Wt 93.8 kg (206 lb 12.7 oz)   SpO2 98%   BMI 28.84 kg/m²                                                    Medical Decision Making    Patient refused splint or intervention here.  Remainder of neuroexam unremarkable.  Both hands appear chronically discolored.  No warmth or coolness appreciated or different between patient's hands.  No murmur.  Exam most consistent with radial nerve palsy.  Final diagnoses:   Radial nerve palsy, right       ED Disposition  ED Disposition       ED Disposition   Discharge    Condition   Stable    Comment   --               Joe Izaguirre MD  [address] [place] [facility]  [phone]    In 2 weeks           Medication List      No changes were made to your prescriptions during this visit.            Yuly Taylor MD  01/03/25 0027    "